# Patient Record
Sex: FEMALE | Race: WHITE | Employment: FULL TIME | URBAN - METROPOLITAN AREA
[De-identification: names, ages, dates, MRNs, and addresses within clinical notes are randomized per-mention and may not be internally consistent; named-entity substitution may affect disease eponyms.]

---

## 2024-03-19 ENCOUNTER — OFFICE VISIT (OUTPATIENT)
Dept: OBSTETRICS AND GYNECOLOGY | Facility: CLINIC | Age: 59
End: 2024-03-19
Payer: COMMERCIAL

## 2024-03-19 VITALS — WEIGHT: 157 LBS | DIASTOLIC BLOOD PRESSURE: 62 MMHG | BODY MASS INDEX: 24.23 KG/M2 | SYSTOLIC BLOOD PRESSURE: 100 MMHG

## 2024-03-19 DIAGNOSIS — K64.9 HEMORRHOIDS, UNSPECIFIED HEMORRHOID TYPE: Primary | ICD-10-CM

## 2024-03-19 DIAGNOSIS — R39.9 UTI SYMPTOMS: ICD-10-CM

## 2024-03-19 LAB
POC APPEARANCE, URINE: CLEAR
POC BILIRUBIN, URINE: NEGATIVE
POC BLOOD, URINE: ABNORMAL
POC COLOR, URINE: YELLOW
POC GLUCOSE, URINE: NEGATIVE MG/DL
POC KETONES, URINE: NEGATIVE MG/DL
POC LEUKOCYTES, URINE: ABNORMAL
POC NITRITE,URINE: POSITIVE
POC PH, URINE: 5 PH
POC PROTEIN, URINE: ABNORMAL MG/DL
POC SPECIFIC GRAVITY, URINE: >=1.03
POC UROBILINOGEN, URINE: 0.2 EU/DL

## 2024-03-19 PROCEDURE — 1036F TOBACCO NON-USER: CPT | Performed by: OBSTETRICS & GYNECOLOGY

## 2024-03-19 PROCEDURE — 87186 SC STD MICRODIL/AGAR DIL: CPT

## 2024-03-19 PROCEDURE — 81003 URINALYSIS AUTO W/O SCOPE: CPT | Performed by: OBSTETRICS & GYNECOLOGY

## 2024-03-19 PROCEDURE — 87086 URINE CULTURE/COLONY COUNT: CPT

## 2024-03-19 PROCEDURE — 99213 OFFICE O/P EST LOW 20 MIN: CPT | Performed by: OBSTETRICS & GYNECOLOGY

## 2024-03-19 RX ORDER — SULFAMETHOXAZOLE AND TRIMETHOPRIM 800; 160 MG/1; MG/1
1 TABLET ORAL 2 TIMES DAILY
Qty: 10 TABLET | Refills: 0 | Status: SHIPPED | OUTPATIENT
Start: 2024-03-19 | End: 2024-03-24

## 2024-03-19 NOTE — PROGRESS NOTES
Subjective   Patient ID: Kati Lauren is a 58 y.o. female who presents for POSSIBLE UTI. Yesterday started with dysuria, frequency/urgency.     Having issues with hemorrhoids.      Objective   Constitutional: Alert and in no acute distress. Well developed, well nourished.  Abdomen: soft nontender; no abdominal mass palpated, no organomegaly and no hernias.  Genitourinary: external genitalia: normal, no inguinal lymphadenopathy, Bartholin's urethral and [place]'s glands: normal, urethra: normal and bladder: normal on palpation.  Nontender exam.  Vagina: normal.  Cervix: normal.  Uterus: normal.   Right adnexa/parametria: normal.  Left adnexa/parametria: normal.  Large external hemorrhoid.  Psychiatric: alert and oriented x 3, affect normal to patient baseline and mood appropriate.     Assessment/Plan   -urine culture.  -Begin Bactrim until results back. Fluids, stop liners.  -refer to Dr. Byers for hemorrhoids.  -Follow up annual.

## 2024-03-20 ENCOUNTER — OFFICE VISIT (OUTPATIENT)
Dept: SURGERY | Facility: CLINIC | Age: 59
End: 2024-03-20
Payer: COMMERCIAL

## 2024-03-20 VITALS
DIASTOLIC BLOOD PRESSURE: 69 MMHG | HEIGHT: 67 IN | WEIGHT: 158.1 LBS | SYSTOLIC BLOOD PRESSURE: 111 MMHG | HEART RATE: 90 BPM | BODY MASS INDEX: 24.81 KG/M2

## 2024-03-20 DIAGNOSIS — K64.9 HEMORRHOIDS, UNSPECIFIED HEMORRHOID TYPE: Primary | ICD-10-CM

## 2024-03-20 PROCEDURE — 99203 OFFICE O/P NEW LOW 30 MIN: CPT | Performed by: PHYSICIAN ASSISTANT

## 2024-03-20 PROCEDURE — 1036F TOBACCO NON-USER: CPT | Performed by: PHYSICIAN ASSISTANT

## 2024-03-20 RX ORDER — HYDROCORTISONE ACETATE PRAMOXINE HCL 2.5; 1 G/100G; G/100G
CREAM TOPICAL 2 TIMES DAILY
Qty: 30 G | Refills: 2 | Status: SHIPPED | OUTPATIENT
Start: 2024-03-20

## 2024-03-20 NOTE — PROGRESS NOTES
"General Surgery Outpatient Consultation      History Of Present Illness  Kati Lauren is a 58 y.o. female who presents with complaints of a hemorrhoid that has been present for years. She first noticed hemorrhoidal issues after having 3 vaginal deliveries around 20 years ago. Patient has since noticed bulging around the anus and feels a bulge that she can push back in, but it will continue to pop back out after any strain. She has taken fibercon daily for years. She has tried sitz baths and steroid cream without much reduction in size. She feels pain after bowel movements that will fluctuate in severity based on what she eats. She does state she lives with chronic constipation. She also notices bleeding after bowel movements. Denies fevers, chills, intolerance of food, abdominal pain.        Past Medical History  Denies     Surgical History  Breast surgery      Social History   reports that she has never smoked. She has never used smokeless tobacco.  with 3 children. Works as a medical assistant. Denies alcohol or illicits.    Family History  Denies family history of diabetes, stroke, MI     Allergies  Patient has no known allergies.    Meds  Current Outpatient Medications   Medication Instructions    hydrocortisone-pramoxine (Analpram-HC) 2.5-1 % cream rectal, 2 times daily    psyllium seed, with sugar, (FIBER ORAL) oral    sulfamethoxazole-trimethoprim (Bactrim DS) 800-160 mg tablet 1 tablet, oral, 2 times daily        Review of Systems   A complete 10 point review of systems was performed and is negative except as noted in the history of present illness.     Last Recorded Vitals  Blood pressure 111/69, pulse 90, height 1.702 m (5' 7\"), weight 71.7 kg (158 lb 1.6 oz).    Physical Exam  Constitutional: No acute distress. Sitting up on exam table.  Neuro: Alert, oriented. Follows commands.   Eyes: Extraocular motions intact. No scleral icterus. Conjunctiva pink.   EENT: Mucous membranes moist. Normal " dentition. Hears normal speaking voice.  Neck: Supple. No masses.  Cardiovascular: Regular rate. No pitting edema.   Respiratory: No increased work of breathing or audible wheeze. Equal expansion.   Abdomen: Soft, non obese abdomen. Nondistended.   Rectal: Prolapsed internal hemorrhoid with pink, friable tissue. Posterior rim of anus with deflated external tissue.   MSK: Moves all extremities. No atrophy.  Lymphatic: No palpable lymph nodes. No lymphedema.   Skin: Warm, dry, intact. No rashes or lesions.   Psychological: Appropriate mood and behavior.           Relevant Results  Laboratory Results:  none    Imaging:  none       Assessment/Plan   This is a 58 y.o. female who presents with complaints of hemorrhoidal issues since having vaginal deliveries 20 years ago. On exam, she does have some component of chronic external hemorrhoids, but her main concern is a prolapsing internal hemorrhoid that is reducible. She would like to avoid a surgical hemorrhoidectomy at this time. I therefore recommend HET to help with the internal hemorrhoids. Discussed potential for needing more than one HET procedure, bleeding, pain, or need for formal surgical hemorrhoidectomy in future.    Plan:  -- HET in OR under MAC  -- Prescription for Analpram to help with symptoms  -- Continue daily fibercon, increase water intake, use sitz baths as needed  -- Follow up in 3 weeks after procedure                 Discussed with Dr. Byers who is in agreement with plan. Please doc halo with questions.    Sue Hdz PA-C

## 2024-03-21 LAB — BACTERIA UR CULT: ABNORMAL

## 2024-04-07 NOTE — PROGRESS NOTES
Subjective   Patient ID: Kati Lauren is a 58 y.o. female who presents for No chief complaint on file..    PAP 11-9-18 NEG HPV NEG  MAMMO 8/6/22 has order  DEXA NEVER  COLON YRS AGO HX POLYP in 30s, went every 3 years. Then nothing. Will check when she is due.     Patient recently seen in March 2024 for UTI. Referred to Dr. Byers for hemorrhoids.    - HAVING INCONTINENCE WHEN EXERCISING, where pad with tennis. Has not felt the same since her daughter, no caffeine. Did not see Dr. Lucas. Using tampons with exercising and it doesn't bother her.     Has hemorrhoids. Doesn't bleed but it bothers her. Does bleed sometimes. No discomfort.      34, 19, 16. Home schooling daughter.     No VB.     Takes vit B12, Takes vit D.       Review of Systems    Objective       Assessment/Plan

## 2024-04-08 ENCOUNTER — OFFICE VISIT (OUTPATIENT)
Dept: OBSTETRICS AND GYNECOLOGY | Facility: CLINIC | Age: 59
End: 2024-04-08
Payer: COMMERCIAL

## 2024-04-08 VITALS
BODY MASS INDEX: 24.39 KG/M2 | DIASTOLIC BLOOD PRESSURE: 60 MMHG | WEIGHT: 155.4 LBS | HEIGHT: 67 IN | SYSTOLIC BLOOD PRESSURE: 110 MMHG

## 2024-04-19 NOTE — PROGRESS NOTES
Subjective   Patient ID: Kati Lauren is a 58 y.o. female who presents for No chief complaint on file..    PAP 11-9-18 NEG HPV NEG  MAMMO 8/6/22 has order  DEXA NEVER  COLON YRS AGO HX POLYP in 30s, went every 3 years. Then nothing. Will check when she is due.     - HAVING INCONTINENCE WHEN EXERCISING, where pad with tennis. Has not felt the same since her daughter, no caffeine. Did not see Dr. Lucas. Using tampons with exercising and it doesn't bother her.     Has hemorrhoids. Doesn't bleed but it bothers her. Does bleed sometimes. No discomfort.      34, 19, 16. Home schooling daughter.     No VB.     Takes vit B12, Takes vit D.       Review of Systems    Objective       Assessment/Plan

## 2024-04-22 ENCOUNTER — APPOINTMENT (OUTPATIENT)
Dept: OBSTETRICS AND GYNECOLOGY | Facility: CLINIC | Age: 59
End: 2024-04-22
Payer: COMMERCIAL

## 2024-05-22 ENCOUNTER — APPOINTMENT (OUTPATIENT)
Dept: SURGERY | Facility: CLINIC | Age: 59
End: 2024-05-22
Payer: COMMERCIAL

## 2024-05-29 ENCOUNTER — ANESTHESIA EVENT (OUTPATIENT)
Dept: OPERATING ROOM | Facility: HOSPITAL | Age: 59
End: 2024-05-29
Payer: COMMERCIAL

## 2024-05-29 ENCOUNTER — TELEPHONE (OUTPATIENT)
Dept: OBSTETRICS AND GYNECOLOGY | Facility: CLINIC | Age: 59
End: 2024-05-29
Payer: COMMERCIAL

## 2024-05-29 DIAGNOSIS — R39.9 UTI SYMPTOMS: Primary | ICD-10-CM

## 2024-05-29 DIAGNOSIS — R39.9 UTI SYMPTOMS: ICD-10-CM

## 2024-05-29 RX ORDER — NITROFURANTOIN 25; 75 MG/1; MG/1
100 CAPSULE ORAL 2 TIMES DAILY
Qty: 14 CAPSULE | Refills: 0 | Status: SHIPPED | OUTPATIENT
Start: 2024-05-29 | End: 2024-06-05

## 2024-05-29 RX ORDER — NITROFURANTOIN 25; 75 MG/1; MG/1
100 CAPSULE ORAL 2 TIMES DAILY
Qty: 14 CAPSULE | Refills: 0 | Status: SHIPPED | OUTPATIENT
Start: 2024-05-29 | End: 2024-05-29

## 2024-06-04 ENCOUNTER — ANESTHESIA (OUTPATIENT)
Dept: OPERATING ROOM | Facility: HOSPITAL | Age: 59
End: 2024-06-04
Payer: COMMERCIAL

## 2024-06-04 ENCOUNTER — HOSPITAL ENCOUNTER (OUTPATIENT)
Facility: HOSPITAL | Age: 59
Setting detail: OUTPATIENT SURGERY
Discharge: HOME | End: 2024-06-04
Attending: SURGERY | Admitting: SURGERY
Payer: COMMERCIAL

## 2024-06-04 VITALS
RESPIRATION RATE: 15 BRPM | BODY MASS INDEX: 23.42 KG/M2 | HEART RATE: 69 BPM | WEIGHT: 154.54 LBS | DIASTOLIC BLOOD PRESSURE: 6 MMHG | TEMPERATURE: 97.5 F | SYSTOLIC BLOOD PRESSURE: 110 MMHG | OXYGEN SATURATION: 98 % | HEIGHT: 68 IN

## 2024-06-04 DIAGNOSIS — K64.9 HEMORRHOIDS, UNSPECIFIED HEMORRHOID TYPE: Primary | ICD-10-CM

## 2024-06-04 PROCEDURE — 7100000010 HC PHASE TWO TIME - EACH INCREMENTAL 1 MINUTE: Performed by: SURGERY

## 2024-06-04 PROCEDURE — 2720000007 HC OR 272 NO HCPCS: Performed by: SURGERY

## 2024-06-04 PROCEDURE — 3700000001 HC GENERAL ANESTHESIA TIME - INITIAL BASE CHARGE: Performed by: SURGERY

## 2024-06-04 PROCEDURE — 3700000002 HC GENERAL ANESTHESIA TIME - EACH INCREMENTAL 1 MINUTE: Performed by: SURGERY

## 2024-06-04 PROCEDURE — 3600000007 HC OR TIME - EACH INCREMENTAL 1 MINUTE - PROCEDURE LEVEL TWO: Performed by: SURGERY

## 2024-06-04 PROCEDURE — 3600000002 HC OR TIME - INITIAL BASE CHARGE - PROCEDURE LEVEL TWO: Performed by: SURGERY

## 2024-06-04 PROCEDURE — 2500000004 HC RX 250 GENERAL PHARMACY W/ HCPCS (ALT 636 FOR OP/ED): Performed by: NURSE ANESTHETIST, CERTIFIED REGISTERED

## 2024-06-04 PROCEDURE — 2500000005 HC RX 250 GENERAL PHARMACY W/O HCPCS: Performed by: SURGERY

## 2024-06-04 PROCEDURE — 7100000009 HC PHASE TWO TIME - INITIAL BASE CHARGE: Performed by: SURGERY

## 2024-06-04 PROCEDURE — 46930 DESTROY INTERNAL HEMORRHOIDS: CPT | Performed by: SURGERY

## 2024-06-04 PROCEDURE — 2500000005 HC RX 250 GENERAL PHARMACY W/O HCPCS: Performed by: NURSE ANESTHETIST, CERTIFIED REGISTERED

## 2024-06-04 PROCEDURE — 2500000004 HC RX 250 GENERAL PHARMACY W/ HCPCS (ALT 636 FOR OP/ED): Performed by: ANESTHESIOLOGY

## 2024-06-04 RX ORDER — MIDAZOLAM HYDROCHLORIDE 1 MG/ML
INJECTION INTRAMUSCULAR; INTRAVENOUS AS NEEDED
Status: DISCONTINUED | OUTPATIENT
Start: 2024-06-04 | End: 2024-06-04

## 2024-06-04 RX ORDER — ONDANSETRON HYDROCHLORIDE 2 MG/ML
4 INJECTION, SOLUTION INTRAVENOUS ONCE AS NEEDED
Status: DISCONTINUED | OUTPATIENT
Start: 2024-06-04 | End: 2024-06-04 | Stop reason: HOSPADM

## 2024-06-04 RX ORDER — LIDOCAINE HYDROCHLORIDE 10 MG/ML
INJECTION, SOLUTION EPIDURAL; INFILTRATION; INTRACAUDAL; PERINEURAL AS NEEDED
Status: DISCONTINUED | OUTPATIENT
Start: 2024-06-04 | End: 2024-06-04

## 2024-06-04 RX ORDER — IBUPROFEN 600 MG/1
600 TABLET ORAL ONCE
Status: DISCONTINUED | OUTPATIENT
Start: 2024-06-04 | End: 2024-06-04 | Stop reason: HOSPADM

## 2024-06-04 RX ORDER — DROPERIDOL 2.5 MG/ML
0.62 INJECTION, SOLUTION INTRAMUSCULAR; INTRAVENOUS ONCE AS NEEDED
Status: DISCONTINUED | OUTPATIENT
Start: 2024-06-04 | End: 2024-06-04 | Stop reason: HOSPADM

## 2024-06-04 RX ORDER — LIDOCAINE HYDROCHLORIDE 20 MG/ML
JELLY TOPICAL AS NEEDED
Status: DISCONTINUED | OUTPATIENT
Start: 2024-06-04 | End: 2024-06-04 | Stop reason: HOSPADM

## 2024-06-04 RX ORDER — OXYCODONE HYDROCHLORIDE 5 MG/1
5 TABLET ORAL EVERY 4 HOURS PRN
Status: DISCONTINUED | OUTPATIENT
Start: 2024-06-04 | End: 2024-06-04 | Stop reason: HOSPADM

## 2024-06-04 RX ORDER — SODIUM CHLORIDE, SODIUM LACTATE, POTASSIUM CHLORIDE, CALCIUM CHLORIDE 600; 310; 30; 20 MG/100ML; MG/100ML; MG/100ML; MG/100ML
100 INJECTION, SOLUTION INTRAVENOUS CONTINUOUS
Status: DISCONTINUED | OUTPATIENT
Start: 2024-06-04 | End: 2024-06-04 | Stop reason: HOSPADM

## 2024-06-04 RX ORDER — PROPOFOL 10 MG/ML
INJECTION, EMULSION INTRAVENOUS AS NEEDED
Status: DISCONTINUED | OUTPATIENT
Start: 2024-06-04 | End: 2024-06-04

## 2024-06-04 RX ORDER — FENTANYL CITRATE 50 UG/ML
INJECTION, SOLUTION INTRAMUSCULAR; INTRAVENOUS AS NEEDED
Status: DISCONTINUED | OUTPATIENT
Start: 2024-06-04 | End: 2024-06-04

## 2024-06-04 RX ADMIN — SODIUM CHLORIDE, POTASSIUM CHLORIDE, SODIUM LACTATE AND CALCIUM CHLORIDE 100 ML/HR: 600; 310; 30; 20 INJECTION, SOLUTION INTRAVENOUS at 07:42

## 2024-06-04 RX ADMIN — MIDAZOLAM HYDROCHLORIDE 1 MG: 1 INJECTION, SOLUTION INTRAMUSCULAR; INTRAVENOUS at 08:07

## 2024-06-04 RX ADMIN — LIDOCAINE HYDROCHLORIDE 50 MG: 10 INJECTION, SOLUTION EPIDURAL; INFILTRATION; INTRACAUDAL; PERINEURAL at 08:13

## 2024-06-04 RX ADMIN — PROPOFOL 50 MG: 10 INJECTION, EMULSION INTRAVENOUS at 08:14

## 2024-06-04 RX ADMIN — PROPOFOL 50 MG: 10 INJECTION, EMULSION INTRAVENOUS at 08:13

## 2024-06-04 RX ADMIN — FENTANYL CITRATE 50 MCG: 50 INJECTION, SOLUTION INTRAMUSCULAR; INTRAVENOUS at 08:07

## 2024-06-04 RX ADMIN — PROPOFOL 140 MCG/KG/MIN: 10 INJECTION, EMULSION INTRAVENOUS at 08:15

## 2024-06-04 SDOH — HEALTH STABILITY: MENTAL HEALTH: CURRENT SMOKER: 0

## 2024-06-04 ASSESSMENT — PAIN - FUNCTIONAL ASSESSMENT: PAIN_FUNCTIONAL_ASSESSMENT: 0-10

## 2024-06-04 ASSESSMENT — PAIN SCALES - GENERAL
PAINLEVEL_OUTOF10: 0 - NO PAIN
PAIN_LEVEL: 2

## 2024-06-04 NOTE — H&P
"History Of Present Illness  Kati Lauren is a 58 y.o. female presenting with hemorrhoids.     Past Medical History  Past Medical History:   Diagnosis Date    Encounter for screening for malignant neoplasm of colon 01/27/2020    Encounter for screening colonoscopy    Hemorrhoids     Rectal bleeding     Urinary incontinence        Surgical History  Past Surgical History:   Procedure Laterality Date    OTHER SURGICAL HISTORY  08/26/2022    Breast surgery        Social History  She reports that she has never smoked. She has never used smokeless tobacco. She reports that she does not currently use alcohol. She reports that she does not use drugs.    Family History  No family history on file.     Allergies  Patient has no known allergies.    Review of Systems     Physical Exam     Last Recorded Vitals  Blood pressure 111/73, pulse 67, temperature 36.3 °C (97.3 °F), resp. rate 16, height 1.727 m (5' 8\"), weight 70.1 kg (154 lb 8.7 oz), SpO2 96%.    Relevant Results        hemorrhoids     Assessment/Plan   Principal Problem:    Hemorrhoids      hemorrhoids       I spent 5 minutes in the professional and overall care of this patient.      Philippe Byers MD    "

## 2024-06-04 NOTE — OP NOTE
HEMORRHOID ENERGY THERAPY     Operative Date: 06/04/24  Patient's Name: Kati Lauren  Patient's YOB: 1965  Patient's MRN: [id number]  Patient's Age: 58 y.o.  Operating Room Location: [facility]  Patient's ASA: I  Patient's Estimated Blood Loss: 1 mL      PREOPERATIVE DIAGNOSIS:   Internal and external hemorrhoids        POSTOPERATIVE DIAGNOSIS:   Internal and external hemorrhoids        OPERATION/PROCEDURE:   Hemorrhoid energy therapy of all 3 internal hemorrhoid columns        SURGEON:   Philippe Byers MD.         ASSISTANT:   Scrub assist.           INDICATIONS:   This is a 58 y.o. female who presented with a symptomatic prolapsing internal hemorrhoids.  She also had external hemorrhoids.  We discussed medical management as well as hemorrhoid energy therapy (HET) for the internal hemorrhoids.  We discussed that if this failed, we could resort to a surgical hemorrhoidectomy.        OPERATION/PROCEDURE:   The reasons for, benefits to, and risks of surgery were discussed with the patient.  The risks include, but are not limited to, bleeding, infection, anal stenosis, incontinence, and recurrence.  The patient appeared to understand and consented for surgery.  She was therefore brought back to the operating room and placed on the operating room table in the left lateral decubitus position.      We started with our lighted HET anoscope and placed it into the anal canal.  We started with the left lateral internal hemorrhoid column.  We elevated the hemorrhoid column between her bipolar plates of the HET anoscope, and cauterized to a temperature of 50 °C.  We then continued circumferentially around the anal canal.  We ensured that we treated all 3 hemorrhoid columns in the same fashion.  We also treated the overlapping tissue between the columns.  We did a total of 2 rounds of treatment around the internal hemorrhoid column.  We then used our lighted HET anoscope to  inspect our treatment regions, and confirmed good bipolar cautery lines.  We then placed rolled up surgical foam soaked in lidocaine jelly into the anal canal for bleeding and pain control.        DISPOSITION:   The patient tolerated the procedure well.  There were no immediate complications.  She will be brought to the postanesthesia care unit. From there, she will be discharged home with outpatient followup with me in a month.      I was present and scrubbed in for the entire procedure.      CPT Codes:  07214      Operating Room Staff:  Anesthesiologist: Sathya Askew MD  CRNA: ADI Babb-CRNA  Circulator: Misti Baeza RN  Scrub Person: Francis Byers MD  General Surgery  Office: [phone]  Fax:     [phone]  8:31 AM  06/04/24

## 2024-06-04 NOTE — ANESTHESIA POSTPROCEDURE EVALUATION
Patient: Kati Lauren    Procedure Summary       Date: 06/04/24 Room / Location: [facility] OR 08 / [facility]    Anesthesia Start: 0754 Anesthesia Stop: 0825    Procedure: HEMORRHOID ENERGY THERAPY Diagnosis:       Hemorrhoids, unspecified hemorrhoid type      (Hemorrhoids, unspecified hemorrhoid type [K64.9])    Surgeons: Philippe Byers MD Responsible Provider: Sathya Askew MD    Anesthesia Type: MAC ASA Status: 1            Anesthesia Type: MAC    Vitals Value Taken Time   BP 98/55 06/04/24 0822   Temp 36.4 °C (97.5 °F) 06/04/24 0822   Pulse 62 06/04/24 0822   Resp 15 06/04/24 0822   SpO2 98 % 06/04/24 0822       Anesthesia Post Evaluation    Patient location during evaluation: PACU  Patient participation: complete - patient participated  Level of consciousness: awake  Pain score: 2  Pain management: adequate  Multimodal analgesia pain management approach  Airway patency: patent  Two or more strategies used to mitigate risk of obstructive sleep apnea  Cardiovascular status: acceptable  Respiratory status: acceptable  Hydration status: acceptable  Postoperative Nausea and Vomiting: none    No notable events documented.

## 2024-06-04 NOTE — ANESTHESIA PREPROCEDURE EVALUATION
Patient: Kati Lauren    Procedure Information       Date/Time: 06/04/24 0800    Procedure: HEMORRHOID ENERGY THERAPY - HEMORRHOID ENERGY THERAPY    Location: [facility] OR 08 / [facility]    Surgeons: Philippe Byers MD            Relevant Problems   Anesthesia (within normal limits)      Cardiac (within normal limits)      Pulmonary (within normal limits)      Neuro (within normal limits)      GI (within normal limits)      /Renal (within normal limits)      Liver (within normal limits)      Endocrine (within normal limits)      Hematology (within normal limits)      Musculoskeletal (within normal limits)      HEENT (within normal limits)      ID (within normal limits)      Skin (within normal limits)       Clinical information reviewed:   Tobacco  Allergies  Meds   Med Hx  Surg Hx  OB Status  Fam Hx  Soc   Hx        NPO Detail:  NPO/Void Status  Carbohydrate Drink Given Prior to Surgery? : N  Date of Last Liquid: 06/03/24  Time of Last Liquid: 2300  Date of Last Solid: 06/03/24  Time of Last Solid: 2300  Last Intake Type: Clear fluids  Time of Last Void: 0700         Physical Exam    Airway  Mallampati: II  TM distance: >3 FB  Neck ROM: full     Cardiovascular - normal exam     Dental - normal exam     Pulmonary - normal exam     Abdominal - normal exam             Anesthesia Plan    History of general anesthesia?: no  History of complications of general anesthesia?: no    ASA 1     MAC     The patient is not a current smoker.    intravenous induction   Anesthetic plan and risks discussed with patient.  Use of blood products discussed with who consented to blood products.    Plan discussed with CRNA and attending.

## 2024-09-11 ENCOUNTER — APPOINTMENT (OUTPATIENT)
Dept: SURGERY | Facility: CLINIC | Age: 59
End: 2024-09-11
Payer: COMMERCIAL

## 2024-09-11 DIAGNOSIS — K64.9 HEMORRHOIDS, UNSPECIFIED HEMORRHOID TYPE: Primary | ICD-10-CM

## 2024-09-11 PROCEDURE — 99213 OFFICE O/P EST LOW 20 MIN: CPT | Performed by: SURGERY

## 2024-09-11 NOTE — PROGRESS NOTES
Subjective   Patient ID: Kati Lauren is a 58 y.o. female who presents for Annual Exam.    PAP 18-26-22 NEG NEG; 1-9-18 NEG HPV NEG  MAMMO 8/6/22 has order  DEXA NEVER  COLON YRS AGO HX POLYP in 30s, went every 3 years. Then nothing.       Referred to Dr. Byers for hemorrhoids. Had light therapy. Doing better. Will get another treatment. Getting a colonoscopy.     Incontinence better, not an issues. Did not see Dr. Lucas. Using tampons with exercising and it doesn't bother her.     Kids all grown.     No VB. Has been getting UTIs, not sexually active. Wiping front to back. Last one 2 weeks ago. Uses a bide.     Takes vit B12, Takes vit D.       Review of Systems   All other systems reviewed and are negative.      Objective   Constitutional: Alert and in no acute distress. Well developed, well nourished.  Neck: no neck asymmetry. Supple and thyroid not enlarged and there were no palpable thyroid nodules.  Chest: Breasts normal appearance, no nipple discharge and no skin changes and palpation of breasts and axillae: no palpable mass and no axillary lymphadenopathy.  Abdomen: soft nontender; no abdominal mass palpated, no organomegaly and no hernias.  Genitourinary: external genitalia: normal, no inguinal lymphadenopathy, Bartholin's urethral and [place]'s glands: normal, urethra: normal and bladder: normal on palpation.  Vagina: normal.  Cervix: normal.  Uterus: normal.   Right adnexa/parametria: normal.  Left adnexa/parametria: normal.  Hemorrhoids improved.  Skin: normal skin color and pigmentation, normal skin turgor and no rash.  Psychiatric: alert and oriented x 3, affect normal to patient baseline and mood appropriate.     Assessment/Plan   -no pap  -italo-bossman  -Requent UTIs. Patient will start by stopping Bide. UTIs started after using that.  -Lipids, CMP, CBC, vit D, TSH. Has Dr. Alicea.

## 2024-09-11 NOTE — H&P (VIEW-ONLY)
"General Surgery Follow-Up Note    Referring Provider:   No primary care provider on file.  No ref. provider found      Chief Complaint:  Persistent hemorrhoids     History of Present Illness:  This is a 58 y.o. female who presents for persistent hemorrhoids.  She was last seen 3 month(s) ago.  She reports drastic improvement in her hemorrhoids, but they are still present.  She reports no further bleeding, but still having prolapsing. She reports it has been more than 10 years since her last colonoscopy.      Vitals:   There were no vitals filed for this visit.      Physical Exam:  General: No acute distress. Sitting up in bed.   Neuro: Alert and oriented ×3. Follows commands.  Head: Atraumatic  Eyes: Pupils equal reactive to light. Extraocular motions intact.  Ears: Hears normal speaking voice.  Mouth, Nose, Throat: Mucous membranes moist.  Normal dentition.  Chest: No crepitus.  No appreciable scars.  Heart: Regular rate and rhythm.  Lung: Clear to auscultation bilaterally.  Vascular: No carotid bruits.  Palpable radial pulses bilaterally.  Abdomen: Soft. Nondistended. Nontender.    Musculoskeletal: Moves all extremities.  Normal range of motion.  Lymphatic: No palpable lymph nodes.  Skin: No rashes or lesions.  Psychological: Normal affect    Labs:  CBC: No results found for: \"WBC\", \"RBC\", \"HGB\", \"HCT\", \"PLT\"    RFP: No results found for: \"GLUF\", \"NA\", \"K\", \"CL\", \"CO2\", \"BUN\", \"CREATININE\", \"CALCIUM\", \"MG\", \"PHOS\"     LFTs: No results found for: \"PROT\", \"ALBUMIN\", \"BILITOT\", \"BILIDIR\", \"ALKPHOS\", \"AST\", \"ALT\"         Imaging: I have personally reviewed the images and the radiologist's report.  No results found.      Assessment:  This is a 58 y.o.-year-old female who presents for hemorrhoids.  We discussed that I recommend a screening colonoscopy with concurrent repeat HET.      Plan:  -- HET  -- Colonoscopy.    Luc Byers MD  General Surgery  Office: [phone]  Fax: [phone]  12:16 " PM  09/11/24        Past Medical History:  Past Medical History:   Diagnosis Date    Encounter for screening for malignant neoplasm of colon 01/27/2020    Encounter for screening colonoscopy    Hemorrhoids     Rectal bleeding     Urinary incontinence         Past Surgical History:  Past Surgical History:   Procedure Laterality Date    OTHER SURGICAL HISTORY  08/26/2022    Breast surgery        Medications:  Current Outpatient Medications   Medication Instructions    hydrocortisone-pramoxine (Analpram-HC) 2.5-1 % cream rectal, 2 times daily    psyllium seed, with sugar, (FIBER ORAL) oral        Allergies:  No Known Allergies     Family History:  No family history on file.     Social History:  Social History     Socioeconomic History    Marital status:    Tobacco Use    Smoking status: Never    Smokeless tobacco: Never   Vaping Use    Vaping status: Never Used   Substance and Sexual Activity    Alcohol use: Not Currently    Drug use: Never    Sexual activity: Yes        Review of Systems:  A complete 12 point review of systems was performed and is negative except as noted in the history of present illness.

## 2024-09-11 NOTE — PROGRESS NOTES
"General Surgery Follow-Up Note    Referring Provider:   No primary care provider on file.  No ref. provider found      Chief Complaint:  Persistent hemorrhoids     History of Present Illness:  This is a 58 y.o. female who presents for persistent hemorrhoids.  She was last seen 3 month(s) ago.  She reports drastic improvement in her hemorrhoids, but they are still present.  She reports no further bleeding, but still having prolapsing. She reports it has been more than 10 years since her last colonoscopy.      Vitals:   There were no vitals filed for this visit.      Physical Exam:  General: No acute distress. Sitting up in bed.   Neuro: Alert and oriented ×3. Follows commands.  Head: Atraumatic  Eyes: Pupils equal reactive to light. Extraocular motions intact.  Ears: Hears normal speaking voice.  Mouth, Nose, Throat: Mucous membranes moist.  Normal dentition.  Chest: No crepitus.  No appreciable scars.  Heart: Regular rate and rhythm.  Lung: Clear to auscultation bilaterally.  Vascular: No carotid bruits.  Palpable radial pulses bilaterally.  Abdomen: Soft. Nondistended. Nontender.    Musculoskeletal: Moves all extremities.  Normal range of motion.  Lymphatic: No palpable lymph nodes.  Skin: No rashes or lesions.  Psychological: Normal affect    Labs:  CBC: No results found for: \"WBC\", \"RBC\", \"HGB\", \"HCT\", \"PLT\"    RFP: No results found for: \"GLUF\", \"NA\", \"K\", \"CL\", \"CO2\", \"BUN\", \"CREATININE\", \"CALCIUM\", \"MG\", \"PHOS\"     LFTs: No results found for: \"PROT\", \"ALBUMIN\", \"BILITOT\", \"BILIDIR\", \"ALKPHOS\", \"AST\", \"ALT\"         Imaging: I have personally reviewed the images and the radiologist's report.  No results found.      Assessment:  This is a 58 y.o.-year-old female who presents for hemorrhoids.  We discussed that I recommend a screening colonoscopy with concurrent repeat HET.      Plan:  -- HET  -- Colonoscopy.    Luc Byers MD  General Surgery  Office: [phone]  Fax: [phone]  12:16 " PM  09/11/24        Past Medical History:  Past Medical History:   Diagnosis Date    Encounter for screening for malignant neoplasm of colon 01/27/2020    Encounter for screening colonoscopy    Hemorrhoids     Rectal bleeding     Urinary incontinence         Past Surgical History:  Past Surgical History:   Procedure Laterality Date    OTHER SURGICAL HISTORY  08/26/2022    Breast surgery        Medications:  Current Outpatient Medications   Medication Instructions    hydrocortisone-pramoxine (Analpram-HC) 2.5-1 % cream rectal, 2 times daily    psyllium seed, with sugar, (FIBER ORAL) oral        Allergies:  No Known Allergies     Family History:  No family history on file.     Social History:  Social History     Socioeconomic History    Marital status:    Tobacco Use    Smoking status: Never    Smokeless tobacco: Never   Vaping Use    Vaping status: Never Used   Substance and Sexual Activity    Alcohol use: Not Currently    Drug use: Never    Sexual activity: Yes        Review of Systems:  A complete 12 point review of systems was performed and is negative except as noted in the history of present illness.

## 2024-09-12 ENCOUNTER — APPOINTMENT (OUTPATIENT)
Dept: OBSTETRICS AND GYNECOLOGY | Facility: CLINIC | Age: 59
End: 2024-09-12
Payer: COMMERCIAL

## 2024-09-12 ENCOUNTER — LAB (OUTPATIENT)
Dept: LAB | Facility: LAB | Age: 59
End: 2024-09-12
Payer: COMMERCIAL

## 2024-09-12 VITALS
BODY MASS INDEX: 24.17 KG/M2 | SYSTOLIC BLOOD PRESSURE: 110 MMHG | DIASTOLIC BLOOD PRESSURE: 70 MMHG | HEIGHT: 67 IN | WEIGHT: 154 LBS

## 2024-09-12 DIAGNOSIS — Z13.21 ENCOUNTER FOR VITAMIN DEFICIENCY SCREENING: ICD-10-CM

## 2024-09-12 DIAGNOSIS — K64.9 HEMORRHOIDS, UNSPECIFIED HEMORRHOID TYPE: ICD-10-CM

## 2024-09-12 DIAGNOSIS — Z13.220 SCREENING CHOLESTEROL LEVEL: ICD-10-CM

## 2024-09-12 DIAGNOSIS — Z00.00 BLOOD TESTS FOR ROUTINE GENERAL PHYSICAL EXAMINATION: ICD-10-CM

## 2024-09-12 DIAGNOSIS — Z13.29 THYROID DISORDER SCREEN: ICD-10-CM

## 2024-09-12 DIAGNOSIS — Z12.11 COLON CANCER SCREENING: Primary | ICD-10-CM

## 2024-09-12 DIAGNOSIS — Z01.419 WELL WOMAN EXAM WITH ROUTINE GYNECOLOGICAL EXAM: Primary | ICD-10-CM

## 2024-09-12 DIAGNOSIS — Z12.31 ENCOUNTER FOR SCREENING MAMMOGRAM FOR MALIGNANT NEOPLASM OF BREAST: ICD-10-CM

## 2024-09-12 LAB
25(OH)D3 SERPL-MCNC: 63 NG/ML (ref 30–100)
ALBUMIN SERPL BCP-MCNC: 4.7 G/DL (ref 3.4–5)
ALP SERPL-CCNC: 72 U/L (ref 33–110)
ALT SERPL W P-5'-P-CCNC: 28 U/L (ref 7–45)
ANION GAP SERPL CALC-SCNC: 14 MMOL/L (ref 10–20)
AST SERPL W P-5'-P-CCNC: 19 U/L (ref 9–39)
BILIRUB SERPL-MCNC: 0.5 MG/DL (ref 0–1.2)
BUN SERPL-MCNC: 14 MG/DL (ref 6–23)
CALCIUM SERPL-MCNC: 10 MG/DL (ref 8.6–10.6)
CHLORIDE SERPL-SCNC: 104 MMOL/L (ref 98–107)
CHOLEST SERPL-MCNC: 262 MG/DL (ref 0–199)
CHOLESTEROL/HDL RATIO: 3.8
CO2 SERPL-SCNC: 28 MMOL/L (ref 21–32)
CREAT SERPL-MCNC: 0.78 MG/DL (ref 0.5–1.05)
EGFRCR SERPLBLD CKD-EPI 2021: 89 ML/MIN/1.73M*2
ERYTHROCYTE [DISTWIDTH] IN BLOOD BY AUTOMATED COUNT: 12.7 % (ref 11.5–14.5)
GLUCOSE SERPL-MCNC: 90 MG/DL (ref 74–99)
HCT VFR BLD AUTO: 43.9 % (ref 36–46)
HDLC SERPL-MCNC: 69 MG/DL
HGB BLD-MCNC: 14.5 G/DL (ref 12–16)
LDLC SERPL CALC-MCNC: 175 MG/DL
MCH RBC QN AUTO: 29.1 PG (ref 26–34)
MCHC RBC AUTO-ENTMCNC: 33 G/DL (ref 32–36)
MCV RBC AUTO: 88 FL (ref 80–100)
NON HDL CHOLESTEROL: 193 MG/DL (ref 0–149)
NRBC BLD-RTO: 0 /100 WBCS (ref 0–0)
PLATELET # BLD AUTO: 357 X10*3/UL (ref 150–450)
POC BLOOD, URINE: NEGATIVE
POC GLUCOSE, URINE: NEGATIVE MG/DL
POC KETONES, URINE: NEGATIVE MG/DL
POC LEUKOCYTES, URINE: NEGATIVE
POC NITRITE,URINE: NEGATIVE
POC PROTEIN, URINE: NEGATIVE MG/DL
POTASSIUM SERPL-SCNC: 4 MMOL/L (ref 3.5–5.3)
PROT SERPL-MCNC: 7.4 G/DL (ref 6.4–8.2)
RBC # BLD AUTO: 4.98 X10*6/UL (ref 4–5.2)
SODIUM SERPL-SCNC: 142 MMOL/L (ref 136–145)
TRIGL SERPL-MCNC: 88 MG/DL (ref 0–149)
TSH SERPL-ACNC: 0.77 MIU/L (ref 0.44–3.98)
VLDL: 18 MG/DL (ref 0–40)
WBC # BLD AUTO: 5 X10*3/UL (ref 4.4–11.3)

## 2024-09-12 PROCEDURE — 3008F BODY MASS INDEX DOCD: CPT | Performed by: OBSTETRICS & GYNECOLOGY

## 2024-09-12 PROCEDURE — 80061 LIPID PANEL: CPT

## 2024-09-12 PROCEDURE — 82306 VITAMIN D 25 HYDROXY: CPT

## 2024-09-12 PROCEDURE — 36415 COLL VENOUS BLD VENIPUNCTURE: CPT

## 2024-09-12 PROCEDURE — 81003 URINALYSIS AUTO W/O SCOPE: CPT | Performed by: OBSTETRICS & GYNECOLOGY

## 2024-09-12 PROCEDURE — 1036F TOBACCO NON-USER: CPT | Performed by: OBSTETRICS & GYNECOLOGY

## 2024-09-12 PROCEDURE — 85027 COMPLETE CBC AUTOMATED: CPT

## 2024-09-12 PROCEDURE — 80053 COMPREHEN METABOLIC PANEL: CPT

## 2024-09-12 PROCEDURE — 99396 PREV VISIT EST AGE 40-64: CPT | Performed by: OBSTETRICS & GYNECOLOGY

## 2024-09-12 PROCEDURE — 84443 ASSAY THYROID STIM HORMONE: CPT

## 2024-09-27 ENCOUNTER — ANESTHESIA EVENT (OUTPATIENT)
Dept: OPERATING ROOM | Facility: HOSPITAL | Age: 59
End: 2024-09-27
Payer: COMMERCIAL

## 2024-09-27 RX ORDER — ONDANSETRON HYDROCHLORIDE 2 MG/ML
4 INJECTION, SOLUTION INTRAVENOUS ONCE AS NEEDED
Status: CANCELLED | OUTPATIENT
Start: 2024-09-27

## 2024-09-27 RX ORDER — SODIUM CHLORIDE, SODIUM LACTATE, POTASSIUM CHLORIDE, CALCIUM CHLORIDE 600; 310; 30; 20 MG/100ML; MG/100ML; MG/100ML; MG/100ML
100 INJECTION, SOLUTION INTRAVENOUS CONTINUOUS
Status: CANCELLED | OUTPATIENT
Start: 2024-09-27

## 2024-09-27 RX ORDER — DROPERIDOL 2.5 MG/ML
0.62 INJECTION, SOLUTION INTRAMUSCULAR; INTRAVENOUS ONCE AS NEEDED
Status: CANCELLED | OUTPATIENT
Start: 2024-09-27

## 2024-09-30 ENCOUNTER — HOSPITAL ENCOUNTER (OUTPATIENT)
Dept: OPERATING ROOM | Facility: HOSPITAL | Age: 59
Setting detail: OUTPATIENT SURGERY
Discharge: HOME | End: 2024-09-30
Payer: COMMERCIAL

## 2024-09-30 ENCOUNTER — ANESTHESIA (OUTPATIENT)
Dept: OPERATING ROOM | Facility: HOSPITAL | Age: 59
End: 2024-09-30
Payer: COMMERCIAL

## 2024-09-30 VITALS
RESPIRATION RATE: 18 BRPM | SYSTOLIC BLOOD PRESSURE: 105 MMHG | TEMPERATURE: 96.8 F | WEIGHT: 150.68 LBS | BODY MASS INDEX: 23.6 KG/M2 | OXYGEN SATURATION: 98 % | DIASTOLIC BLOOD PRESSURE: 68 MMHG | HEART RATE: 68 BPM

## 2024-09-30 DIAGNOSIS — Z12.11 COLON CANCER SCREENING: ICD-10-CM

## 2024-09-30 DIAGNOSIS — K64.9 HEMORRHOIDS, UNSPECIFIED HEMORRHOID TYPE: ICD-10-CM

## 2024-09-30 PROCEDURE — 3600000007 HC OR TIME - EACH INCREMENTAL 1 MINUTE - PROCEDURE LEVEL TWO: Performed by: ANESTHESIOLOGY

## 2024-09-30 PROCEDURE — 7100000009 HC PHASE TWO TIME - INITIAL BASE CHARGE: Performed by: ANESTHESIOLOGY

## 2024-09-30 PROCEDURE — 3700000002 HC GENERAL ANESTHESIA TIME - EACH INCREMENTAL 1 MINUTE: Performed by: ANESTHESIOLOGY

## 2024-09-30 PROCEDURE — 2720000007 HC OR 272 NO HCPCS: Performed by: ANESTHESIOLOGY

## 2024-09-30 PROCEDURE — 7100000010 HC PHASE TWO TIME - EACH INCREMENTAL 1 MINUTE: Performed by: ANESTHESIOLOGY

## 2024-09-30 PROCEDURE — 2500000004 HC RX 250 GENERAL PHARMACY W/ HCPCS (ALT 636 FOR OP/ED): Performed by: NURSE ANESTHETIST, CERTIFIED REGISTERED

## 2024-09-30 PROCEDURE — 45380 COLONOSCOPY AND BIOPSY: CPT | Performed by: SURGERY

## 2024-09-30 PROCEDURE — 3600000002 HC OR TIME - INITIAL BASE CHARGE - PROCEDURE LEVEL TWO: Performed by: ANESTHESIOLOGY

## 2024-09-30 PROCEDURE — 45388 COLONOSCOPY W/ABLATION: CPT | Performed by: SURGERY

## 2024-09-30 PROCEDURE — 3700000001 HC GENERAL ANESTHESIA TIME - INITIAL BASE CHARGE: Performed by: ANESTHESIOLOGY

## 2024-09-30 PROCEDURE — 2500000004 HC RX 250 GENERAL PHARMACY W/ HCPCS (ALT 636 FOR OP/ED): Performed by: ANESTHESIOLOGY

## 2024-09-30 PROCEDURE — 46930 DESTROY INTERNAL HEMORRHOIDS: CPT | Performed by: SURGERY

## 2024-09-30 PROCEDURE — 2500000005 HC RX 250 GENERAL PHARMACY W/O HCPCS: Performed by: NURSE ANESTHETIST, CERTIFIED REGISTERED

## 2024-09-30 RX ORDER — FENTANYL CITRATE 50 UG/ML
INJECTION, SOLUTION INTRAMUSCULAR; INTRAVENOUS AS NEEDED
Status: DISCONTINUED | OUTPATIENT
Start: 2024-09-30 | End: 2024-09-30

## 2024-09-30 RX ORDER — PHENYLEPHRINE HCL IN 0.9% NACL 0.4MG/10ML
SYRINGE (ML) INTRAVENOUS AS NEEDED
Status: DISCONTINUED | OUTPATIENT
Start: 2024-09-30 | End: 2024-09-30

## 2024-09-30 RX ORDER — LIDOCAINE HYDROCHLORIDE 10 MG/ML
INJECTION, SOLUTION EPIDURAL; INFILTRATION; INTRACAUDAL; PERINEURAL AS NEEDED
Status: DISCONTINUED | OUTPATIENT
Start: 2024-09-30 | End: 2024-09-30

## 2024-09-30 RX ORDER — MIDAZOLAM HYDROCHLORIDE 1 MG/ML
INJECTION INTRAMUSCULAR; INTRAVENOUS AS NEEDED
Status: DISCONTINUED | OUTPATIENT
Start: 2024-09-30 | End: 2024-09-30

## 2024-09-30 RX ORDER — SODIUM CHLORIDE, SODIUM LACTATE, POTASSIUM CHLORIDE, CALCIUM CHLORIDE 600; 310; 30; 20 MG/100ML; MG/100ML; MG/100ML; MG/100ML
20 INJECTION, SOLUTION INTRAVENOUS CONTINUOUS
Status: DISCONTINUED | OUTPATIENT
Start: 2024-09-30 | End: 2024-10-01 | Stop reason: HOSPADM

## 2024-09-30 RX ORDER — PROPOFOL 10 MG/ML
INJECTION, EMULSION INTRAVENOUS AS NEEDED
Status: DISCONTINUED | OUTPATIENT
Start: 2024-09-30 | End: 2024-09-30

## 2024-09-30 SDOH — HEALTH STABILITY: MENTAL HEALTH: CURRENT SMOKER: 0

## 2024-09-30 ASSESSMENT — PAIN - FUNCTIONAL ASSESSMENT
PAIN_FUNCTIONAL_ASSESSMENT: 0-10
PAIN_FUNCTIONAL_ASSESSMENT: 0-10

## 2024-09-30 ASSESSMENT — COLUMBIA-SUICIDE SEVERITY RATING SCALE - C-SSRS
2. HAVE YOU ACTUALLY HAD ANY THOUGHTS OF KILLING YOURSELF?: NO
6. HAVE YOU EVER DONE ANYTHING, STARTED TO DO ANYTHING, OR PREPARED TO DO ANYTHING TO END YOUR LIFE?: NO
1. IN THE PAST MONTH, HAVE YOU WISHED YOU WERE DEAD OR WISHED YOU COULD GO TO SLEEP AND NOT WAKE UP?: NO

## 2024-09-30 ASSESSMENT — PAIN SCALES - GENERAL
PAIN_LEVEL: 2
PAINLEVEL_OUTOF10: 0 - NO PAIN

## 2024-09-30 NOTE — DISCHARGE INSTRUCTIONS
Patient Instructions after a Colonoscopy      The anesthetics, sedatives or narcotics which were given to you today will be acting in your body for the next 24 hours, so you might feel a little sleepy or groggy.  This feeling should slowly wear off. Carefully read and follow the instructions.     You received sedation today:  - Do not drive or operate any machinery or power tools of any kind.   - No alcoholic beverages today, not even beer or wine.  - Do not make any important decisions or sign any legal documents.  - No over the counter medications that contain alcohol or that may cause drowsiness.  - Do not make any important decisions or sign any legal documents.  - Make sure you have someone with you for first 24 hours.    While it is common to experience mild to moderate abdominal distention, gas, or belching after your procedure, if any of these symptoms occur following discharge from the GI Lab or within one week of having your procedure, call the [facility] to be advised whether a visit to your nearest Urgent Care or Emergency Department is indicated.  Take this paper with you if you go.     - If you develop an allergic reaction to the medications that were given during your procedure such as difficulty breathing, rash, hives, severe nausea, vomiting or lightheadedness.  - If you experience chest pain, shortness of breath, severe abdominal pain, fevers and chills.  -If you develop signs and symptoms of bleeding such as blood in your spit, if your stools turn black, tarry, or bloody  - If you have not urinated within 8 hours following your procedure.  - If your IV site becomes painful, red, inflamed, or looks infected.    If you received a biopsy/polypectomy/sphincterotomy the following instructions apply below:    __ Do not use Aspirin containing products, non-steroidal medications or anti-coagulants for one week following your procedure. (Examples of these types of medications are: Advil,  Arthrotec, Aleve, Coumadin, Ecotrin, Heparin, Ibuprofen, Indocin, Motrin, Naprosyn, Nuprin, Plavix, Vioxx, and Voltarin, or their generic forms.  This list is not all-inclusive.  Check with your physician or pharmacist before resuming medications.)   __ Eat a soft diet today.  Avoid foods that are poorly digested for the next 24 hours.  These foods would include: nuts, beans, lettuce, red meats, and fried foods. Start with liquids and advance your diet as tolerated, gradually work up to eating solids.   __ Do not have a Barium Study or Enema for one week.    Your physician recommends the additional following instructions:    -You have a contact number available for emergencies. The signs and symptoms of potential delayed complications were discussed with you. You may return to normal activities tomorrow.  -Resume your previous diet.  -Continue your present medications.   -We are waiting for your pathology results.  -Your physician has recommended a repeat colonoscopy (date to be determined after pending pathology results are reviewed) for surveillance based on pathology results.  -The findings and recommendations have been discussed with you.  -The findings and recommendations were discussed with your family.  - Please see Medication Reconciliation Form for new medication/medications prescribed.       If you experience any problems or have any questions following discharge from the GI Lab, please call:        Nurse Signature                                                                        Date___________________                                                                            Patient/Responsible Party Signature                                        Date___________________

## 2024-09-30 NOTE — ANESTHESIA POSTPROCEDURE EVALUATION
Patient: Kati Lauren    Procedure Summary       Date: 09/30/24 Room / Location: [facility] OR    Anesthesia Start: 0753 Anesthesia Stop: 0830    Procedure: COLONOSCOPY Diagnosis:       Hemorrhoids, unspecified hemorrhoid type      Colon cancer screening    Scheduled Providers: Philippe Byers MD; Sathya Askew MD Responsible Provider: Sathya Askew MD    Anesthesia Type: MAC ASA Status: 1            Anesthesia Type: MAC    Vitals Value Taken Time   /68 09/30/24 0855   Temp 36 °C (96.8 °F) 09/30/24 0825   Pulse 68 09/30/24 0855   Resp 18 09/30/24 0855   SpO2 98 % 09/30/24 0855       Anesthesia Post Evaluation    Patient location during evaluation: PACU  Patient participation: complete - patient participated  Level of consciousness: awake  Pain score: 2  Pain management: adequate  Multimodal analgesia pain management approach  Airway patency: patent  Two or more strategies used to mitigate risk of obstructive sleep apnea  Cardiovascular status: acceptable  Respiratory status: acceptable  Hydration status: acceptable  Postoperative Nausea and Vomiting: none    No notable events documented.

## 2024-09-30 NOTE — ANESTHESIA PREPROCEDURE EVALUATION
Patient: Kati Lauren    Procedure Information       Date/Time: 09/30/24 0810    Scheduled providers: Philippe Byers MD; Sathya Askew MD    Procedure: COLONOSCOPY    Location: [facility] OR     01 +    There were no vitals filed for this visit.    Past Surgical History:   Procedure Laterality Date   • OTHER SURGICAL HISTORY N/A 08/26/2022    hemorrhoid light therapy     Past Medical History:   Diagnosis Date   • Allergic contact dermatitis due to plants, except food 08/14/2024   • Dysuria 06/30/2024   • Encounter for screening for malignant neoplasm of colon 01/27/2020    Encounter for screening colonoscopy   • Encounter for screening for malignant neoplasm of colon 09/30/2024   • Hemorrhoids    • Herpes labialis 01/25/2022   • Rectal bleeding    • Urinary incontinence        Current Outpatient Medications:   •  psyllium seed, with sugar, (FIBER ORAL), Take by mouth., Disp: , Rfl:   •  hydrocortisone-pramoxine (Analpram-HC) 2.5-1 % cream, Insert into the rectum 2 times a day. (Patient not taking: Reported on 6/4/2024), Disp: 30 g, Rfl: 2    Current Facility-Administered Medications:   •  lactated Ringer's infusion, 20 mL/hr, intravenous, Continuous, Carl Alexander MD, Last Rate: 20 mL/hr at 09/30/24 0733, 20 mL/hr at 09/30/24 0733  Prior to Admission medications    Medication Sig Start Date End Date Taking? Authorizing Provider   psyllium seed, with sugar, (FIBER ORAL) Take by mouth.   Yes Historical Provider, MD   hydrocortisone-pramoxine (Analpram-HC) 2.5-1 % cream Insert into the rectum 2 times a day.  Patient not taking: Reported on 6/4/2024 3/20/24   Sue Hdz PA-C     No Known Allergies  Social History     Tobacco Use   • Smoking status: Never   • Smokeless tobacco: Never   Substance Use Topics   • Alcohol use: Not Currently         Chemistry    Lab Results   Component Value Date/Time     09/12/2024 1242    K 4.0 09/12/2024 1242     09/12/2024 1242    CO2 28  "09/12/2024 1242    BUN 14 09/12/2024 1242    CREATININE 0.78 09/12/2024 1242    Lab Results   Component Value Date/Time    CALCIUM 10.0 09/12/2024 1242    ALKPHOS 72 09/12/2024 1242    AST 19 09/12/2024 1242    ALT 28 09/12/2024 1242    BILITOT 0.5 09/12/2024 1242          Lab Results   Component Value Date/Time    WBC 5.0 09/12/2024 1242    HGB 14.5 09/12/2024 1242    HCT 43.9 09/12/2024 1242     09/12/2024 1242     No results found for: \"PROTIME\", \"PTT\", \"INR\"  No results found for this or any previous visit (from the past 4464 hour(s)).  No results found for this or any previous visit from the past 1095 days.        Relevant Problems   No relevant active problems       Clinical information reviewed:   Tobacco  Allergies  Meds   Med Hx  Surg Hx   Fam Hx  Soc Hx        NPO Detail:  NPO/Void Status  Date of Last Liquid: 09/30/24  Time of Last Liquid: 1230  Date of Last Solid: 09/28/24         Physical Exam    Airway  Mallampati: II  TM distance: >3 FB  Neck ROM: full     Cardiovascular - normal exam     Dental    Pulmonary - normal exam     Abdominal - normal exam         Anesthesia Plan    History of general anesthesia?: yes  History of complications of general anesthesia?: no    ASA 1     MAC     The patient is not a current smoker.    intravenous induction   Anesthetic plan and risks discussed with patient.    Plan discussed with CRNA and attending.  "

## 2024-10-09 LAB
LABORATORY COMMENT REPORT: NORMAL
PATH REPORT.FINAL DX SPEC: NORMAL
PATH REPORT.GROSS SPEC: NORMAL
PATH REPORT.RELEVANT HX SPEC: NORMAL
PATH REPORT.TOTAL CANCER: NORMAL

## 2025-01-14 ENCOUNTER — OFFICE VISIT (OUTPATIENT)
Dept: GASTROENTEROLOGY | Facility: HOSPITAL | Age: 60
End: 2025-01-14
Payer: COMMERCIAL

## 2025-01-14 VITALS — BODY MASS INDEX: 22.91 KG/M2 | HEIGHT: 67 IN | WEIGHT: 146 LBS

## 2025-01-14 DIAGNOSIS — R10.12 LUQ PAIN: Primary | ICD-10-CM

## 2025-01-14 DIAGNOSIS — K59.00 CONSTIPATION, UNSPECIFIED CONSTIPATION TYPE: ICD-10-CM

## 2025-01-14 DIAGNOSIS — R14.2 BELCHING: ICD-10-CM

## 2025-01-14 PROCEDURE — 99213 OFFICE O/P EST LOW 20 MIN: CPT

## 2025-01-14 PROCEDURE — 99203 OFFICE O/P NEW LOW 30 MIN: CPT

## 2025-01-14 PROCEDURE — 3008F BODY MASS INDEX DOCD: CPT

## 2025-01-14 RX ORDER — FAMOTIDINE 40 MG/1
40 TABLET, FILM COATED ORAL DAILY
Qty: 30 TABLET | Refills: 11 | Status: SHIPPED | OUTPATIENT
Start: 2025-01-14 | End: 2026-01-14

## 2025-01-14 ASSESSMENT — ENCOUNTER SYMPTOMS
ANAL BLEEDING: 0
BLOOD IN STOOL: 0
COUGH: 0
APPETITE CHANGE: 0
SHORTNESS OF BREATH: 0
DIARRHEA: 0
CONSTIPATION: 1
ABDOMINAL PAIN: 1
RECTAL PAIN: 0
TROUBLE SWALLOWING: 0
NAUSEA: 0
FEVER: 0
FATIGUE: 0
CHILLS: 0
VOMITING: 0
ABDOMINAL DISTENTION: 0

## 2025-01-14 NOTE — ASSESSMENT & PLAN NOTE
-Recommend patient continue her daily fiber supplement and start MiraLAX instead of Swiss rizwana supplement with senna

## 2025-01-14 NOTE — PATIENT INSTRUCTIONS
I recommend taking 1 capful of Miralax daily in 8 oz of liquid.  This is to help move bowels and soften stool.  They also make Miralax gummies. You may try this instead of the swiss rizwana   Try to minimize acidic foods such as citrus fruits, tomatoes, and pop. Also try to avoid chocolate, peppermint, alcohol, and caffeine.  Avoid eating within 2-3 hours of lying down.   Eat more frequent smaller meals instead of a few large meals.  You can prop the head of your bed up when you are sleeping to decrease reflux.  Please take Pepcid 30-60 minutes before a meal daily.  This is to help calm stomach acid.  Notify me in 1 month if not better.

## 2025-01-14 NOTE — PROGRESS NOTES
Subjective     History of Present Illness:   Kati Lauren is a 59 y.o. female with no significant PMHx who presents to GI clinic for further evaluation of GERD    Today, patient here for GERD.  Has gas bubble up to her shoulders, can make herself belch if pressing LUQ, has LUQ pressure/pain  Has had flare of GERD for the past 3 weeks.  Tried 20 mg prilosec and nexium, gas-x, which didn't work.  Sometimes feels acid reflux.  Has been stressed. Denies NSAID use.  Takes daily fiber supplement and swiss rizwana laxative daily.  Moves bowels daily with normal formed stools. Otherwise will have constipation.   Is going to PT for shoulder injury and neck nerve pain.  Denies constipation, diarrhea,  melena, hematochezia, dysphagia, unintentional weight loss    Denies ETOH, smoking, marijuana  Denies fxh GI cancer or IBD  Abdominal Surgeries: denies    Last colonoscopy 9/2024 Dr. Byers: Mild localized erythematous, friable mucosa cecum-clips placed, mild inflammation in cecum-clip placed, mild localized erythema rectum, 3 external and internal prolapsing hemorrhoids-ablated/HET.  Negative pathology  Denies EGD       Past Medical History  As per HPI.     Social History  she  reports that she has never smoked. She has never used smokeless tobacco. She reports that she does not currently use alcohol. She reports that she does not use drugs.     Family History  her family history is not on file.     Review of Systems  Review of Systems   Constitutional:  Negative for appetite change, chills, fatigue and fever.   HENT:  Negative for trouble swallowing.    Respiratory:  Negative for cough and shortness of breath.    Gastrointestinal:  Positive for abdominal pain (LUQ) and constipation. Negative for abdominal distention, anal bleeding, blood in stool, diarrhea, nausea, rectal pain and vomiting.       Allergies  No Known Allergies    Medications  Current Outpatient Medications   Medication Instructions    famotidine (PEPCID) 40 mg,  oral, Daily    hydrocortisone-pramoxine (Analpram-HC) 2.5-1 % cream rectal, 2 times daily    psyllium seed, with sugar, (FIBER ORAL) oral        Objective   There were no vitals taken for this visit.   Physical Exam  Constitutional:       Appearance: Normal appearance. She is normal weight.   HENT:      Mouth/Throat:      Mouth: Mucous membranes are dry.      Pharynx: Oropharynx is clear.   Cardiovascular:      Rate and Rhythm: Normal rate and regular rhythm.   Pulmonary:      Effort: Pulmonary effort is normal.      Breath sounds: Normal breath sounds. No wheezing or rhonchi.   Abdominal:      General: Abdomen is flat. Bowel sounds are normal. There is no distension.      Palpations: Abdomen is soft. There is no hepatomegaly.      Tenderness: There is no abdominal tenderness. There is no guarding or rebound. Negative signs include Craft's sign.      Hernia: No hernia is present.   Musculoskeletal:         General: Normal range of motion.   Skin:     General: Skin is warm and dry.   Neurological:      General: No focal deficit present.      Mental Status: She is alert and oriented to person, place, and time.   Psychiatric:         Mood and Affect: Mood normal.         Behavior: Behavior normal.           Lab Results   Component Value Date    WBC 5.0 09/12/2024    HGB 14.5 09/12/2024    HCT 43.9 09/12/2024     09/12/2024     Lab Results   Component Value Date     09/12/2024    K 4.0 09/12/2024     09/12/2024    CO2 28 09/12/2024    BUN 14 09/12/2024    CREATININE 0.78 09/12/2024    CALCIUM 10.0 09/12/2024    PROT 7.4 09/12/2024    BILITOT 0.5 09/12/2024    ALKPHOS 72 09/12/2024    ALT 28 09/12/2024    AST 19 09/12/2024    GLUCOSE 90 09/12/2024           Kati Lauren is a 59 y.o. female who presents to GI clinic for LUQ pain and belching.    Constipation  -Recommend patient continue her daily fiber supplement and start MiraLAX instead of Swiss rizwana supplement with senna     LUQ pain  - start 40  mg daily pepcid  - antireflux regimen  - consider EGD if not better in 1 month         Deysi Matthew, APRN-CNP

## 2025-03-22 ENCOUNTER — APPOINTMENT (OUTPATIENT)
Dept: RADIOLOGY | Facility: HOSPITAL | Age: 60
End: 2025-03-22
Payer: COMMERCIAL

## 2025-03-22 ENCOUNTER — HOSPITAL ENCOUNTER (OUTPATIENT)
Facility: HOSPITAL | Age: 60
Setting detail: OBSERVATION
Discharge: HOME | End: 2025-03-22
Attending: EMERGENCY MEDICINE | Admitting: STUDENT IN AN ORGANIZED HEALTH CARE EDUCATION/TRAINING PROGRAM
Payer: COMMERCIAL

## 2025-03-22 ENCOUNTER — APPOINTMENT (OUTPATIENT)
Dept: CARDIOLOGY | Facility: HOSPITAL | Age: 60
End: 2025-03-22
Payer: COMMERCIAL

## 2025-03-22 VITALS
HEIGHT: 68 IN | DIASTOLIC BLOOD PRESSURE: 63 MMHG | SYSTOLIC BLOOD PRESSURE: 103 MMHG | TEMPERATURE: 98.6 F | OXYGEN SATURATION: 97 % | HEART RATE: 65 BPM | RESPIRATION RATE: 18 BRPM | BODY MASS INDEX: 22.43 KG/M2 | WEIGHT: 148 LBS

## 2025-03-22 DIAGNOSIS — R07.9 CHEST PAIN, UNSPECIFIED TYPE: ICD-10-CM

## 2025-03-22 DIAGNOSIS — M79.2 RADICULAR PAIN IN LEFT ARM: Primary | ICD-10-CM

## 2025-03-22 LAB
ALBUMIN SERPL BCP-MCNC: 4.3 G/DL (ref 3.4–5)
ALP SERPL-CCNC: 75 U/L (ref 33–110)
ALT SERPL W P-5'-P-CCNC: 31 U/L (ref 7–45)
ANION GAP SERPL CALC-SCNC: 12 MMOL/L (ref 10–20)
APTT PPP: 29 SECONDS (ref 26–36)
AST SERPL W P-5'-P-CCNC: 23 U/L (ref 9–39)
BASOPHILS # BLD AUTO: 0.04 X10*3/UL (ref 0–0.1)
BASOPHILS NFR BLD AUTO: 0.6 %
BILIRUB SERPL-MCNC: 0.5 MG/DL (ref 0–1.2)
BUN SERPL-MCNC: 19 MG/DL (ref 6–23)
CALCIUM SERPL-MCNC: 9.6 MG/DL (ref 8.6–10.3)
CARDIAC TROPONIN I PNL SERPL HS: 3 NG/L (ref 0–13)
CARDIAC TROPONIN I PNL SERPL HS: 3 NG/L (ref 0–13)
CHLORIDE SERPL-SCNC: 107 MMOL/L (ref 98–107)
CO2 SERPL-SCNC: 24 MMOL/L (ref 21–32)
CREAT SERPL-MCNC: 0.75 MG/DL (ref 0.5–1.05)
D DIMER PPP FEU-MCNC: <215 NG/ML FEU
EGFRCR SERPLBLD CKD-EPI 2021: >90 ML/MIN/1.73M*2
EOSINOPHIL # BLD AUTO: 0.05 X10*3/UL (ref 0–0.7)
EOSINOPHIL NFR BLD AUTO: 0.7 %
ERYTHROCYTE [DISTWIDTH] IN BLOOD BY AUTOMATED COUNT: 12.5 % (ref 11.5–14.5)
GLUCOSE SERPL-MCNC: 86 MG/DL (ref 74–99)
HCT VFR BLD AUTO: 42 % (ref 36–46)
HGB BLD-MCNC: 14.2 G/DL (ref 12–16)
IMM GRANULOCYTES # BLD AUTO: 0.01 X10*3/UL (ref 0–0.7)
IMM GRANULOCYTES NFR BLD AUTO: 0.1 % (ref 0–0.9)
INR PPP: 1 (ref 0.9–1.1)
LYMPHOCYTES # BLD AUTO: 1.79 X10*3/UL (ref 1.2–4.8)
LYMPHOCYTES NFR BLD AUTO: 26.5 %
MAGNESIUM SERPL-MCNC: 2.01 MG/DL (ref 1.6–2.4)
MCH RBC QN AUTO: 29.2 PG (ref 26–34)
MCHC RBC AUTO-ENTMCNC: 33.8 G/DL (ref 32–36)
MCV RBC AUTO: 86 FL (ref 80–100)
MONOCYTES # BLD AUTO: 0.36 X10*3/UL (ref 0.1–1)
MONOCYTES NFR BLD AUTO: 5.3 %
NEUTROPHILS # BLD AUTO: 4.5 X10*3/UL (ref 1.2–7.7)
NEUTROPHILS NFR BLD AUTO: 66.8 %
NRBC BLD-RTO: 0 /100 WBCS (ref 0–0)
PLATELET # BLD AUTO: 286 X10*3/UL (ref 150–450)
POTASSIUM SERPL-SCNC: 3.7 MMOL/L (ref 3.5–5.3)
PROT SERPL-MCNC: 7.1 G/DL (ref 6.4–8.2)
PROTHROMBIN TIME: 11.2 SECONDS (ref 9.8–12.4)
RBC # BLD AUTO: 4.86 X10*6/UL (ref 4–5.2)
SODIUM SERPL-SCNC: 139 MMOL/L (ref 136–145)
WBC # BLD AUTO: 6.8 X10*3/UL (ref 4.4–11.3)

## 2025-03-22 PROCEDURE — 2550000001 HC RX 255 CONTRASTS: Performed by: EMERGENCY MEDICINE

## 2025-03-22 PROCEDURE — 2500000001 HC RX 250 WO HCPCS SELF ADMINISTERED DRUGS (ALT 637 FOR MEDICARE OP): Performed by: STUDENT IN AN ORGANIZED HEALTH CARE EDUCATION/TRAINING PROGRAM

## 2025-03-22 PROCEDURE — 73030 X-RAY EXAM OF SHOULDER: CPT | Mod: LEFT SIDE | Performed by: RADIOLOGY

## 2025-03-22 PROCEDURE — 36415 COLL VENOUS BLD VENIPUNCTURE: CPT | Performed by: EMERGENCY MEDICINE

## 2025-03-22 PROCEDURE — 84484 ASSAY OF TROPONIN QUANT: CPT

## 2025-03-22 PROCEDURE — 71275 CT ANGIOGRAPHY CHEST: CPT

## 2025-03-22 PROCEDURE — 71046 X-RAY EXAM CHEST 2 VIEWS: CPT

## 2025-03-22 PROCEDURE — 99285 EMERGENCY DEPT VISIT HI MDM: CPT | Mod: 25 | Performed by: EMERGENCY MEDICINE

## 2025-03-22 PROCEDURE — 80053 COMPREHEN METABOLIC PANEL: CPT

## 2025-03-22 PROCEDURE — 2500000004 HC RX 250 GENERAL PHARMACY W/ HCPCS (ALT 636 FOR OP/ED): Performed by: STUDENT IN AN ORGANIZED HEALTH CARE EDUCATION/TRAINING PROGRAM

## 2025-03-22 PROCEDURE — G0378 HOSPITAL OBSERVATION PER HR: HCPCS

## 2025-03-22 PROCEDURE — 85379 FIBRIN DEGRADATION QUANT: CPT | Performed by: STUDENT IN AN ORGANIZED HEALTH CARE EDUCATION/TRAINING PROGRAM

## 2025-03-22 PROCEDURE — 96372 THER/PROPH/DIAG INJ SC/IM: CPT | Performed by: STUDENT IN AN ORGANIZED HEALTH CARE EDUCATION/TRAINING PROGRAM

## 2025-03-22 PROCEDURE — 93005 ELECTROCARDIOGRAM TRACING: CPT

## 2025-03-22 PROCEDURE — 73030 X-RAY EXAM OF SHOULDER: CPT | Mod: LT

## 2025-03-22 PROCEDURE — 36415 COLL VENOUS BLD VENIPUNCTURE: CPT

## 2025-03-22 PROCEDURE — 85610 PROTHROMBIN TIME: CPT | Performed by: EMERGENCY MEDICINE

## 2025-03-22 PROCEDURE — 85025 COMPLETE CBC W/AUTO DIFF WBC: CPT

## 2025-03-22 PROCEDURE — 2500000004 HC RX 250 GENERAL PHARMACY W/ HCPCS (ALT 636 FOR OP/ED): Performed by: EMERGENCY MEDICINE

## 2025-03-22 PROCEDURE — 83735 ASSAY OF MAGNESIUM: CPT

## 2025-03-22 PROCEDURE — 71046 X-RAY EXAM CHEST 2 VIEWS: CPT | Mod: LEFT SIDE | Performed by: RADIOLOGY

## 2025-03-22 PROCEDURE — 71275 CT ANGIOGRAPHY CHEST: CPT | Performed by: RADIOLOGY

## 2025-03-22 RX ORDER — CYCLOBENZAPRINE HCL 10 MG
10 TABLET ORAL 2 TIMES DAILY PRN
Qty: 5 TABLET | Refills: 0 | Status: SHIPPED | OUTPATIENT
Start: 2025-03-22

## 2025-03-22 RX ORDER — ENOXAPARIN SODIUM 100 MG/ML
40 INJECTION SUBCUTANEOUS EVERY 24 HOURS
Status: DISCONTINUED | OUTPATIENT
Start: 2025-03-22 | End: 2025-03-22 | Stop reason: HOSPADM

## 2025-03-22 RX ORDER — PREDNISONE 20 MG/1
40 TABLET ORAL ONCE
Status: COMPLETED | OUTPATIENT
Start: 2025-03-22 | End: 2025-03-22

## 2025-03-22 RX ORDER — PREDNISONE 10 MG/1
TABLET ORAL
Qty: 20 TABLET | Refills: 0 | Status: SHIPPED | OUTPATIENT
Start: 2025-03-22 | End: 2025-03-30

## 2025-03-22 RX ORDER — FAMOTIDINE 20 MG/1
40 TABLET, FILM COATED ORAL DAILY
Status: DISCONTINUED | OUTPATIENT
Start: 2025-03-22 | End: 2025-03-22 | Stop reason: HOSPADM

## 2025-03-22 RX ADMIN — IOHEXOL 75 ML: 350 INJECTION, SOLUTION INTRAVENOUS at 13:46

## 2025-03-22 RX ADMIN — PREDNISONE 40 MG: 20 TABLET ORAL at 16:31

## 2025-03-22 ASSESSMENT — PAIN SCALES - GENERAL
PAINLEVEL_OUTOF10: 10 - WORST POSSIBLE PAIN
PAINLEVEL_OUTOF10: 5 - MODERATE PAIN

## 2025-03-22 ASSESSMENT — COLUMBIA-SUICIDE SEVERITY RATING SCALE - C-SSRS
2. HAVE YOU ACTUALLY HAD ANY THOUGHTS OF KILLING YOURSELF?: NO
1. IN THE PAST MONTH, HAVE YOU WISHED YOU WERE DEAD OR WISHED YOU COULD GO TO SLEEP AND NOT WAKE UP?: NO
6. HAVE YOU EVER DONE ANYTHING, STARTED TO DO ANYTHING, OR PREPARED TO DO ANYTHING TO END YOUR LIFE?: NO

## 2025-03-22 ASSESSMENT — PAIN DESCRIPTION - DESCRIPTORS
DESCRIPTORS: SHOOTING;STABBING
DESCRIPTORS: RADIATING

## 2025-03-22 ASSESSMENT — PAIN - FUNCTIONAL ASSESSMENT: PAIN_FUNCTIONAL_ASSESSMENT: 0-10

## 2025-03-22 ASSESSMENT — PAIN DESCRIPTION - ORIENTATION: ORIENTATION: LEFT

## 2025-03-22 ASSESSMENT — PAIN DESCRIPTION - PAIN TYPE: TYPE: ACUTE PAIN

## 2025-03-22 ASSESSMENT — PAIN DESCRIPTION - FREQUENCY: FREQUENCY: INTERMITTENT

## 2025-03-22 ASSESSMENT — PAIN DESCRIPTION - LOCATION: LOCATION: CHEST

## 2025-03-22 NOTE — ED TRIAGE NOTES
As provider-in-triage, I performed a medical screening history and physical exam on this patient.  HISTORY OF PRESENT ILLNESS  Patient is a 51-year-old female sent to the ED with concern for chest pain.  Patient states this is been going on for months.  Pain is substernal and radiates down her left arm into her jaw.  Patient did see a doctor for this a couple months ago who told her it could be GERD and started her on Pepcid.  Patient is been taking Pepcid and aspirin at home with no relief of symptoms.  Starting today the pain was radiating to her left shoulder blade.  Pain is rated 7/10.  She denies any shortness of breath, cough, congestion, rhinorrhea, sore throat, hemoptysis nausea, vomiting, or abdominal pain.  Denies any cardiac risk factors or previous cardiac history.  Denies any calf pain or swelling, hormone therapy, cancer, previous blood clot, recent surgery.  She did recently have a 2-hour plane ride from [place] last week.     PHYSICAL EXAM  Vital Signs reviewed.  Cardiovascular: Regular rate and rhythm. No m/g/r  Respiratory: No respiratory distress. No accessory muscle use. Breath sounds are present and equal b/l. No adventitious sounds.        MDM  Workup initiated.  EKG reviewed, no STEMI.  Pt stable pending bed availability and further evaluation. Please see subsequent provider note for further details and disposition.         I evaluated this patient in triage with the RN. Due to the patients complaint labs and or imaging were ordered by myself in an attempt to expedite patient care however I am not participating in care after evaluation. This is a preliminary assessment. Pt does not appear in acute distress at this time. They will have a full evaluation as soon as possible. They will be cared for by another provider who will possibly order more labs, imaging or interventions. Pt did not have a full ROS or PE completed by myself however below is a summary with reasons for orders.  For the  remainder of the patient's workup and ED course, please refer to the main ED provider note. We discussed need for diagnostic testing including laboratory studies and imaging.  We also discussed that patient may be asked to wait in the waiting room while these tests are pending.  They understand that if they choose to leave without having the testing completed or resulted that we cannot rule out acute life threatening illnesses and the risks involved could lead to worsening condition, permanent disability or even death.

## 2025-03-22 NOTE — DISCHARGE INSTRUCTIONS
You were seen emergency room today for evaluation of chest pain and shoulder pain rating down your left arm.  Admission was recommended for further cardiac testing however an outpatient stress test would be reasonable.  Please follow-up with your primary care provider regarding this emergency room visit.  Please return if you have worsening symptoms recommend other concerns.  Please trial steroid taper and as needed Flexeril for radicular pain.  Please return if you have any worsening symptoms or any other concerns.

## 2025-03-22 NOTE — ED PROVIDER NOTES
History/Exam limitations: none.   Additional history was obtained from patient, spouse/SO, and past medical records.          HPI:    Kati Lauren is a 59 y.o. female PMH GERD presenting for evaluation of chest pain.  Patient states over the last 2 months approximate she is been having left-sided chest pain has been relatively consistent.  She did have an improvement in her symptoms however it came back around Tuesday of this past week and has  been constant since, 4 days ago.  She states that it has been relatively consistent, left-sided, pleuritic with radiation down her left arm.  She states that she feels it in her left lateral chest and around the left side underneath her arm.  Denies any tearing type sensation through her back or in her midsternal area.  States that she has not noticed any significant shortness of breath but  pain is pleuritic.  Began having jaw discomfort today so came in to be seen.  Nonexertional, no diaphoresis.  Jaw discomfort resolved.  No headache, vision changes, fever, chills, cough, congestion, unilateral leg swelling.  No weakness or numbness.  Does have some discomfort in her left trapezius musculature as well.  Reports has been taking Motrin and aspirin.  States that she was told she has high cholesterol but has not yet seen her primary care provider regarding this.  Saw her gastroenterology at onset of symptoms and was started on Pepcid.  Was not markedly improving symptoms but did seem to help things.  States that she is attempting to schedule an EGD.         Physical Exam:  ED Triage Vitals [03/22/25 1201]   Temperature Heart Rate Respirations BP   37 °C (98.6 °F) 65 18 (!) 125/108      Pulse Ox Temp Source Heart Rate Source Patient Position   99 % Temporal Monitor --      BP Location FiO2 (%)     -- --        GEN:      Alert, NAD  Eyes:       PERRL, EOMI  HENT:      NC/AT, OP clear, airway patent, MM  CV:      RRR, no MRG, no LE pitting edema, 2+ radial and pedal  pulses  PULM:     CTAB, no w/r/r, easy WOB, symmetric chest rise  ABD:      Soft, NT, ND, no masses, BS +  NEURO:   A/Ox4, CN II-XII intact, strength 5/5 BUE shoulder flexion extension/elbow flexion extension/wrist flexion extension/ strength, SILT, FNF normal b/l, no pronator drift  MSK:      FROM, no joint deformities or swelling, no e/o trauma, no midline CTL spine tenderness or step-offs, tenderness to the left trapezius and infraspinatus musculature with reproducibility of lateral back discomfort  SKIN:       Warm and dry  PSYCH:    Appropriate mood and affect         MDM/ED Course:   Kati Lauren is a 59 y.o. female PMH GERD presenting for evaluation of chest pain.  Triage vitals markable for elevated diastolic blood pressure which resolved without intervention.  Exam as documented above.  Differential includes ACS, PNA, PE, aortic dissection/pathology, pneumothorax, pericardial effusion, pericarditis, myocarditis, GERD, musculoskeletal pain, pleurisy.  I have a low sufficient for aortic dissection given patient's pain in the lateral aspect of her back is more radicular in nature, reproducible, no tearing type sensation between chest and back pain, is not in the mid left is quite lateral.  Additionally reassuring that patient has had symptoms for 2 months, has equal pulses, is nontoxic-appearing with minimal risk factors for acute aortic pathology.  Unlikely myocarditis given history, exam, vitals.  Unlikely pericarditis given no positional component, no friction rub, EKG as below.  IV placed and labs drawn.  CBC with no leukocytosis or significant anemia.  Chemistry reassuring.  Troponin negative at 3 ng/L x 2.  A left shoulder and chest x-ray were obtained from triage, possible subchondral cyst on the left clavicle chest x-ray with no acute findings.  CT angio chest for PE displays no significant bony lesion, no PE, no evidence of pneumonia.  I am unsure why patient had the discomfort in the jaw today.   Was at rest while working.  Given this I did discuss option of admission for stress testing given patient's untreated hyperlipidemia, borderline between low risk and moderate risk heart score.  Patient would not like to be admitted and I believe this is reasonable given patient is well-appearing with negative troponins, minimal risk factors, relatively chronic symptoms.  Reports she has not had any exertional symptoms and works out every morning including cardio which is reassuring.  On reassessment does have reproducible pain left trapezius and left clavicular area, no active radiation of pain down the arm.  Neurovasc intact left upper extremity.  Patient plans to follow-up with the primary care provider to discuss stress testing.  States that she has had improved symptoms with oral steroids in the past, p.o. prednisone taper provided as well as as needed Flexeril.  Patient was accepted by the observation team for admission however requested deferring admission and admission was canceled.  The observation team did appear to order a D-dimer which ultimately resulted undetectably low.  Explained findings, exam/study results, the importance of follow up and the plan moving forward; patient and/or caregiver verbalized understanding and agreement. All questions were answered and no new questions at this time. Patient will be discharged with strict return precautions, follow up plan with PCP.    EKG reviewed by me: 12:05 PM normal sinus rhythm, rate 70, borderline left however normal axis, normal intervals, no STEMI, no ectopy, no acute ischemic changes, no prior for comparison.     Diagnoses as of 03/22/25 1842   Radicular pain in left arm   Chest pain, unspecified type         Chronic medical conditions affecting care listed in MDM. I independently reviewed imaging studies and agreed with radiology reads. I reviewed recent and relevant outside records including PCP notes, Prior discharge summaries, and prior radiology  reports.    Procedure  Procedures    Diagnosis:   1.  Chest pain  2.  Left upper extremity radicular pain    Dispo: Discharged in stable condition      Disclaimer: Portions of this note were dictated by speech recognition. An attempt at proof reading was made to minimize errors. Minor errors in transcription may be present.  Please call if questions.     Darrell Rivera MD  03/22/25 5839

## 2025-03-22 NOTE — ED TRIAGE NOTES
Patient c/o intermittent left sided chest pain that radiates into the left arm and into the left jaw. Pt denies SOB. Pt alert and oriented x 4.

## 2025-03-24 LAB
ATRIAL RATE: 70 BPM
P AXIS: 67 DEGREES
P OFFSET: 209 MS
P ONSET: 159 MS
PR INTERVAL: 134 MS
Q ONSET: 226 MS
QRS COUNT: 12 BEATS
QRS DURATION: 84 MS
QT INTERVAL: 374 MS
QTC CALCULATION(BAZETT): 403 MS
QTC FREDERICIA: 393 MS
R AXIS: -15 DEGREES
T AXIS: 65 DEGREES
T OFFSET: 413 MS
VENTRICULAR RATE: 70 BPM

## 2025-04-02 ENCOUNTER — OFFICE VISIT (OUTPATIENT)
Dept: GASTROENTEROLOGY | Facility: CLINIC | Age: 60
End: 2025-04-02
Payer: COMMERCIAL

## 2025-04-02 VITALS — BODY MASS INDEX: 22.73 KG/M2 | WEIGHT: 150 LBS | OXYGEN SATURATION: 98 % | HEIGHT: 68 IN | HEART RATE: 77 BPM

## 2025-04-02 DIAGNOSIS — R10.12 LUQ PAIN: Primary | ICD-10-CM

## 2025-04-02 DIAGNOSIS — R14.2 BELCHING: ICD-10-CM

## 2025-04-02 PROCEDURE — 3008F BODY MASS INDEX DOCD: CPT

## 2025-04-02 PROCEDURE — 99214 OFFICE O/P EST MOD 30 MIN: CPT

## 2025-04-02 RX ORDER — METHYLPREDNISOLONE 4 MG/1
TABLET ORAL
COMMUNITY
Start: 2025-01-08

## 2025-04-02 RX ORDER — BENZONATATE 200 MG/1
CAPSULE ORAL
COMMUNITY
Start: 2024-11-25

## 2025-04-02 ASSESSMENT — ENCOUNTER SYMPTOMS
BLOOD IN STOOL: 0
FATIGUE: 0
TROUBLE SWALLOWING: 0
CHILLS: 0
APPETITE CHANGE: 0
ABDOMINAL DISTENTION: 0
FEVER: 0
CONSTIPATION: 0
DIARRHEA: 0
ANAL BLEEDING: 0
RECTAL PAIN: 0
NAUSEA: 0
CHEST TIGHTNESS: 1
SHORTNESS OF BREATH: 0
VOMITING: 0
COUGH: 0
ABDOMINAL PAIN: 1

## 2025-04-02 NOTE — ASSESSMENT & PLAN NOTE
Consider esophagitis, gastritis, duodenitis, PUD.  Possible cardiac or non-GI related   -20 mg Voquenza samples  -Schedule EGD  -PCP referral placed    Follow-up annually or as needed

## 2025-04-02 NOTE — PATIENT INSTRUCTIONS
Please schedule your upper endoscopy.  Plan to have a ride for this procedure since it involves sedation.  Please do not eat or drink anything after midnight the night prior to this procedure.  I will give you Voquezna samples.    I recommend [facility] [phone] for primary care or [facility] [phone]

## 2025-04-02 NOTE — PROGRESS NOTES
Subjective     History of Present Illness:   Kati Lauren is a 59 y.o. female with no significant PMHx who presents to GI clinic for follow-up  Last seen 1/2025 for constipation, continue fiber supplement, start daily MiraLAX.  LUQ pain, 40 mg Pepcid, consider EGD    Today,  Patient states pepcid is not doing anything.  Has left upper abdominal/CP radiating to her left upper back, neck and shoulder.  Lots of belching and is very uncomfortable.    Tried 20 mg prilosec and nexium, gas-x, which didn't work.  Denies NSAID use.  Went to the ED since last visit and was recommended to have a cardiac stress test.  Needs a new PCP.  Feels she needs an MRI.  Is going to PT for shoulder injury and neck nerve pain.  Denies constipation, diarrhea,  melena, hematochezia, dysphagia, unintentional weight loss    Denies ETOH, smoking, marijuana  Denies fxh GI cancer or IBD  Abdominal Surgeries: denies    Last colonoscopy 9/2024 Dr. Byers: Mild localized erythematous, friable mucosa cecum-clips placed, mild inflammation in cecum-clip placed, mild localized erythema rectum, 3 external and internal prolapsing hemorrhoids-ablated/HET.  Negative pathology  Denies EGD       Past Medical History  As per HPI.     Social History  she  reports that she has never smoked. She has never used smokeless tobacco. She reports that she does not currently use alcohol. She reports that she does not use drugs.     Family History  her family history is not on file.     Review of Systems  Review of Systems   Constitutional:  Negative for appetite change, chills, fatigue and fever.   HENT:  Negative for trouble swallowing.    Respiratory:  Positive for chest tightness. Negative for cough and shortness of breath.    Gastrointestinal:  Positive for abdominal pain (LUQ). Negative for abdominal distention, anal bleeding, blood in stool, constipation, diarrhea, nausea, rectal pain and vomiting.       Allergies  No Known Allergies    Medications  Current  Outpatient Medications   Medication Instructions    benzonatate (Tessalon) 200 mg capsule TAKE 1 CAPSULE BY MOUTH THREE TIMES A DAY AS NEEDED FOR UP TO 7 DAYS.    cyclobenzaprine (FLEXERIL) 10 mg, oral, 2 times daily PRN    famotidine (PEPCID) 40 mg, oral, Daily    hydrocortisone-pramoxine (Analpram-HC) 2.5-1 % cream rectal, 2 times daily    Medrol, Joe, 4 mg tablets Take by mouth.    psyllium seed, with sugar, (FIBER ORAL) oral        Objective   Visit Vitals  Pulse 77      Physical Exam      Lab Results   Component Value Date    WBC 6.8 03/22/2025    WBC 5.0 09/12/2024    HGB 14.2 03/22/2025    HGB 14.5 09/12/2024    HCT 42.0 03/22/2025    HCT 43.9 09/12/2024     03/22/2025     09/12/2024     Lab Results   Component Value Date     03/22/2025     09/12/2024    K 3.7 03/22/2025    K 4.0 09/12/2024     03/22/2025     09/12/2024    CO2 24 03/22/2025    CO2 28 09/12/2024    BUN 19 03/22/2025    BUN 14 09/12/2024    CREATININE 0.75 03/22/2025    CREATININE 0.78 09/12/2024    CALCIUM 9.6 03/22/2025    CALCIUM 10.0 09/12/2024    PROT 7.1 03/22/2025    PROT 7.4 09/12/2024    BILITOT 0.5 03/22/2025    BILITOT 0.5 09/12/2024    ALKPHOS 75 03/22/2025    ALKPHOS 72 09/12/2024    ALT 31 03/22/2025    ALT 28 09/12/2024    AST 23 03/22/2025    AST 19 09/12/2024    GLUCOSE 86 03/22/2025    GLUCOSE 90 09/12/2024           Kati Lauren is a 59 y.o. female who presents to GI clinic for LUQ pain and belching.    LUQ pain  Consider esophagitis, gastritis, duodenitis, PUD.  Possible cardiac or non-GI related   -20 mg Voquenza samples  -Schedule EGD  -PCP referral placed    Follow-up annually or as needed           Deysi Matthew, APRN-CNP

## 2025-04-23 ENCOUNTER — APPOINTMENT (OUTPATIENT)
Dept: GASTROENTEROLOGY | Facility: EXTERNAL LOCATION | Age: 60
End: 2025-04-23
Payer: COMMERCIAL

## 2025-04-23 DIAGNOSIS — R14.2 BELCHING: ICD-10-CM

## 2025-04-23 DIAGNOSIS — K21.9 GASTROESOPHAGEAL REFLUX DISEASE WITHOUT ESOPHAGITIS: Primary | ICD-10-CM

## 2025-04-23 DIAGNOSIS — R10.12 LUQ PAIN: ICD-10-CM

## 2025-04-23 PROCEDURE — 43239 EGD BIOPSY SINGLE/MULTIPLE: CPT | Performed by: INTERNAL MEDICINE

## 2025-04-23 PROCEDURE — 88305 TISSUE EXAM BY PATHOLOGIST: CPT

## 2025-04-23 PROCEDURE — 88305 TISSUE EXAM BY PATHOLOGIST: CPT | Performed by: PATHOLOGY

## 2025-04-23 PROCEDURE — 0753T DGTZ GLS MCRSCP SLD LEVEL IV: CPT

## 2025-04-23 NOTE — PROGRESS NOTES
Patient presents with symptoms of belching and other symptoms of pain in the chest and in the back and down the arm that seem more musculoskeletal in nature as she is able to change the character by movement, pressure and even lies on 3 tennis balls in her back.  At this point her upper endoscopy is essentially normal.  Biopsies were done for H. pylori.  I will continue her current acid related blocker and we will check biopsies for H. pylori.  I am trying to reassure her that most of her symptoms seem more musculoskeletal not gastrointestinal.  She is up-to-date on colonoscopy

## 2025-04-24 ENCOUNTER — LAB REQUISITION (OUTPATIENT)
Dept: LAB | Facility: HOSPITAL | Age: 60
End: 2025-04-24
Payer: COMMERCIAL

## 2025-04-24 DIAGNOSIS — R10.12 LEFT UPPER QUADRANT PAIN: ICD-10-CM

## 2025-04-25 ENCOUNTER — TELEPHONE (OUTPATIENT)
Dept: OBSTETRICS AND GYNECOLOGY | Facility: CLINIC | Age: 60
End: 2025-04-25
Payer: COMMERCIAL

## 2025-04-28 DIAGNOSIS — R53.83 OTHER FATIGUE: Primary | ICD-10-CM

## 2025-04-28 DIAGNOSIS — E78.00 ELEVATED CHOLESTEROL: ICD-10-CM

## 2025-04-28 NOTE — TELEPHONE ENCOUNTER
Patient called. She was seen in the ER recently for chest and arm pain. They wanted to do further workup with stress test/ECHO but she declined because she had to work. I explained that checking her estrogen levels will not help in this situation. She has been menopausal for several years and her estrogen levels should be low. She needs to get a work up for cardiac issues and as I have told her before she needs a PCP. I am willing to repeat her lipids and check a ferritin, iron and TSH as it has been over 6 months since those were done. At the end of our call I transferred her to our  to get names of PCPs that are taking new patients. I explained the indications for HRT and would not recommend it in her case. She definitely needs a further cardiac work up. She should go back to the ER is symptoms are worse or not improving.

## 2025-06-09 ENCOUNTER — APPOINTMENT (OUTPATIENT)
Dept: PRIMARY CARE | Facility: CLINIC | Age: 60
End: 2025-06-09
Payer: COMMERCIAL

## 2025-06-09 ENCOUNTER — OFFICE VISIT (OUTPATIENT)
Dept: PRIMARY CARE | Facility: CLINIC | Age: 60
End: 2025-06-09
Payer: COMMERCIAL

## 2025-06-09 VITALS
BODY MASS INDEX: 23.64 KG/M2 | WEIGHT: 156 LBS | OXYGEN SATURATION: 97 % | SYSTOLIC BLOOD PRESSURE: 111 MMHG | HEART RATE: 58 BPM | HEIGHT: 68 IN | DIASTOLIC BLOOD PRESSURE: 77 MMHG

## 2025-06-09 DIAGNOSIS — Z00.00 ROUTINE GENERAL MEDICAL EXAMINATION AT A HEALTH CARE FACILITY: Primary | ICD-10-CM

## 2025-06-09 DIAGNOSIS — G89.29 CHRONIC RIGHT SHOULDER PAIN: ICD-10-CM

## 2025-06-09 DIAGNOSIS — K59.00 CONSTIPATION, UNSPECIFIED CONSTIPATION TYPE: ICD-10-CM

## 2025-06-09 DIAGNOSIS — N95.9 POST MENOPAUSAL PROBLEMS: ICD-10-CM

## 2025-06-09 DIAGNOSIS — M25.511 CHRONIC RIGHT SHOULDER PAIN: ICD-10-CM

## 2025-06-09 PROCEDURE — 3008F BODY MASS INDEX DOCD: CPT | Performed by: PHYSICIAN ASSISTANT

## 2025-06-09 PROCEDURE — 99386 PREV VISIT NEW AGE 40-64: CPT | Performed by: PHYSICIAN ASSISTANT

## 2025-06-09 ASSESSMENT — ENCOUNTER SYMPTOMS
ABDOMINAL PAIN: 0
DIZZINESS: 0
NERVOUS/ANXIOUS: 0
DEPRESSION: 0
PSYCHIATRIC NEGATIVE: 1
PALPITATIONS: 0
ARTHRALGIAS: 1
OCCASIONAL FEELINGS OF UNSTEADINESS: 0
RESPIRATORY NEGATIVE: 1
VOMITING: 0
COUGH: 0
SHORTNESS OF BREATH: 0
HEMATOLOGIC/LYMPHATIC NEGATIVE: 1
CONSTIPATION: 1
ENDOCRINE NEGATIVE: 1
ALLERGIC/IMMUNOLOGIC NEGATIVE: 1
ACTIVITY CHANGE: 0
LOSS OF SENSATION IN FEET: 0
FATIGUE: 1
WHEEZING: 0
EYES NEGATIVE: 1
NEUROLOGICAL NEGATIVE: 1
NAUSEA: 0
HEADACHES: 0
BACK PAIN: 0

## 2025-06-09 ASSESSMENT — PATIENT HEALTH QUESTIONNAIRE - PHQ9
2. FEELING DOWN, DEPRESSED OR HOPELESS: NOT AT ALL
1. LITTLE INTEREST OR PLEASURE IN DOING THINGS: NOT AT ALL
SUM OF ALL RESPONSES TO PHQ9 QUESTIONS 1 AND 2: 0

## 2025-06-09 NOTE — PROGRESS NOTES
Subjective   Patient ID: Kati Lauren is a 59 y.o. female who presents for New Patient Visit (Pt states she is not sure why she is here today ).    HPI   Patient Kati Lauren 59 y.o. 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 - 3 grown children     specialists GYN   UTD dental and vision UTD     PMhx significant medical hx frozen shoulder-- right --     post menopausal sxs -x a few years - neck and shoulder pain on left - wt gain - low energy - constipation -- wishes to see some one who specializes in menopause -      also inquiring about a  BS monitor aida - pr is not diabetic or prediabetic but would like to have one to monitor her Blood sugars due to her other symptoms-- pt states she is aware that it ma not be covered    Has had all these sxs for a few years and just tried to self manage with healthy diet and exercise  Neck pain -- saw cori and had PT-- as well   Acid reflux --saw [facility] has EGD and colonoscopy     Social hx: etoh, no tobacco use, no illicit drug use   Watches diet and exercise spin triathlon - 2-3 hours per day   immunizations   FMhx: mother, father both alive and well   Past medical, surgical, social, and family history all reviewed   Colonoscopy and EGD - 6 months  at [facility]-- for GERD   Pt declines all vaccines       .patient states feeling well otherwise  denies fever, chills, N/V, headache, dizziness, CP, SOB, palpitations, edema, numbness, tingling, weakness  A chaperone was offered to the patient for the physical exam /  exam and was declined       Review of Systems   Constitutional:  Positive for fatigue. Negative for activity change.   HENT: Negative.     Eyes: Negative.    Respiratory: Negative.  Negative for cough, shortness of breath and wheezing.    Cardiovascular:  Negative for chest pain and  "palpitations.   Gastrointestinal:  Positive for constipation. Negative for abdominal pain, nausea and vomiting.        Acid reflux -    Endocrine: Negative.    Genitourinary: Negative.    Musculoskeletal:  Positive for arthralgias. Negative for back pain.        Hx neck pain and rigtht frozen shoulder   Chronic bilateral shoulder pain    Skin: Negative.  Negative for rash.   Allergic/Immunologic: Negative.    Neurological: Negative.  Negative for dizziness and headaches.   Hematological: Negative.    Psychiatric/Behavioral: Negative.  The patient is not nervous/anxious.        Objective   /77 (BP Location: Right arm, Patient Position: Sitting)   Pulse 58   Ht 1.727 m (5' 8\")   Wt 70.8 kg (156 lb)   SpO2 97%   BMI 23.72 kg/m²     Physical Exam  Vitals and nursing note reviewed.   Constitutional:       Appearance: Normal appearance. She is normal weight.   HENT:      Head: Normocephalic and atraumatic.      Right Ear: Tympanic membrane, ear canal and external ear normal.      Left Ear: Tympanic membrane, ear canal and external ear normal.      Nose: Nose normal.      Mouth/Throat:      Mouth: Mucous membranes are moist.      Pharynx: Oropharynx is clear.   Eyes:      Extraocular Movements: Extraocular movements intact.      Pupils: Pupils are equal, round, and reactive to light.   Cardiovascular:      Rate and Rhythm: Normal rate and regular rhythm.      Heart sounds: Normal heart sounds.   Pulmonary:      Effort: Pulmonary effort is normal.      Breath sounds: Normal breath sounds.   Abdominal:      General: Abdomen is flat. Bowel sounds are normal.      Palpations: Abdomen is soft.   Genitourinary:     Comments: Per GYN   Musculoskeletal:         General: Normal range of motion.      Cervical back: Normal range of motion and neck supple.   Skin:     General: Skin is warm and dry.   Neurological:      General: No focal deficit present.      Mental Status: She is alert and oriented to person, place, and " time.   Psychiatric:         Mood and Affect: Mood normal.         Behavior: Behavior normal.         Thought Content: Thought content normal.         Judgment: Judgment normal.         Assessment/Plan   Problem List Items Addressed This Visit       Constipation     Other Visit Diagnoses         Routine general medical examination at a health care facility    -  Primary    Relevant Medications    FreeStyle Flory 14 Day Sensor kit      Chronic right shoulder pain          Post menopausal problems        Relevant Orders    Referral to Obstetrics        Est/  -  patient stable and healthy  -  discussed healthy diet and exercise plan   -  continue medications as directed, SEs, risks and options discussed   -  f/u with specialists as directed   -  UTD on dental and vision exams, f/u as directed   -Vaccinations recommended today: Influenza, Prevnar,  Tdap-- declines all immunizations   -Follow-up annual exam in one year  -Colon cancer screening UTD 2024  -Follow-up in one week to discuss all results\    Menopausal sxs - low energy - shoulder pain -- advised pt that I do not specialize in menopause - but happy to refer her to Gyn - dayanara arnold - for further workup     Also requesting Flory -- pt aware that it may not be covered since no dx of hyperglycemia - pt says she still wishes flory       Gerd - stable sees GI --    Constipation for many years-  manges it with daily fiber and hydration anad exercise     complexity visit of 35+  minutes face-to-face with at least half of the time discussing  Results of my examination, clinical findings, impression and diagnoses discussed with the patient as well as results of the latest test/lab work. The patient was in agreement with the plan and verbalized a good understanding of instructions and plans for treatment. At the end of the visit today, the patient felt that all questions had been answered satisfactorily. The patient was pleased with the visit and very appreciative for  the care rendered.        18

## 2025-06-11 RX ORDER — FLASH GLUCOSE SENSOR
KIT MISCELLANEOUS
Qty: 1 EACH | Refills: 0 | Status: SHIPPED | OUTPATIENT
Start: 2025-06-11

## 2025-06-25 NOTE — PROGRESS NOTES
"INITIAL EVALUATION       HISTORY OF PRESENT ILLNESS:   Kati Lauren is a 59 y.o. female whom presents to me today for evaluation of menopausal symptoms, referred to me by Alina Phan PA-C. Patient reports symptoms of neck and left shoulder pain, weight gain most noticeable in her mid section, low energy, vaginal dryness, dyspareunia, and constipation. Reports that she has done a lot of research and she is interested in the Vivelle Dot and progesterone therapy for MHT.   (She attended a Menopause [facility]).    She eats \"cleanly\", works out for 2 hours daily and maintains a very healthy lifestyle, so the changes in her health have been distressing to her.    No h/o VTE, liver disease or clotting disorders. Denies a family history of breast, ovarian, and uterine cancer.     PAST MEDICAL HISTORY:  Medical History[1]    PAST SURGICAL HISTORY:  Surgical History[2]     ALLERGIES:   Allergies[3]     MEDICATIONS:   Medication Documentation Review Audit       Reviewed by Erica Leyva MA (Medical Assistant) on 06/26/25 at 0904      Medication Order Taking? Sig Documenting Provider Last Dose Status   famotidine (Pepcid) 40 mg tablet [id number]  Take 1 tablet (40 mg) by mouth once daily.   Patient not taking: Reported on 6/26/2025    Deysi Matthew APRN-CNP  Active   FreeStyle Flory 14 Day Sensor kit [id number] Yes Use as instructed Alina Phan PA-C  Active                     SOCIAL HISTORY:  Patient  reports that she has never smoked. She has never used smokeless tobacco. She reports that she does not currently use alcohol. She reports that she does not use drugs.   Social History     Socioeconomic History    Marital status:      Spouse name: Not on file    Number of children: Not on file    Years of education: Not on file    Highest education level: Not on file   Occupational History    Not on file   Tobacco Use    Smoking status: Never    Smokeless tobacco: Never   Vaping Use    Vaping status: Never " "Used   Substance and Sexual Activity    Alcohol use: Not Currently    Drug use: Never    Sexual activity: Yes   Other Topics Concern    Not on file   Social History Narrative    Not on file     Social Drivers of Health     Financial Resource Strain: Not on file   Food Insecurity: Not on file   Transportation Needs: Not on file   Physical Activity: Not on file   Stress: Not on file   Social Connections: Not on file   Intimate Partner Violence: Not on file   Housing Stability: Not on file       FAMILY HISTORY:  Family History[4]    REVIEW OF SYSTEMS:  Constitutional: Negative for fever and chills. Denies anorexia, weight loss.  Eyes: Negative for visual disturbance.   Respiratory: Negative for shortness of breath.    Cardiovascular: Negative for chest pain.   Gastrointestinal: Negative for nausea and vomiting.   Genitourinary: See interval history above.  Skin: Negative for rash.   Neurological: Negative for dizziness and numbness.   Psychiatric/Behavioral: Negative for confusion and decreased concentration.     PHYSICAL EXAM:  Blood pressure 117/76, pulse 67, height 1.727 m (5' 8\"), weight 69.9 kg (154 lb).  Constitutional: Patient appears well-developed and well-nourished. No distress.    Head: Normocephalic and atraumatic.    Neck: Normal range of motion.    Cardiovascular: Normal rate.    Pulmonary/Chest: Effort normal. No respiratory distress.   Musculoskeletal: Normal range of motion.    Neurological: Alert and oriented to person, place, and time.  Psychiatric: Normal mood and affect. Behavior is normal. Thought content normal.       Assessment:      1. Menopausal symptoms            Kati Lauren is a 59 y.o. female with menopausal symptoms.     Current research considers the initiation of menopausal hormone therapy (MHT) to be a safe option for healthy, symptomatic women who are within 10 years of menopause or younger than age 60 years and who do not have contraindications to MHT (such as a history of breast " cancer, coronary heart disease, a previous venous thromboembolic event or stroke, or active liver disease).     Benefits include improvement in mood, sleep, and hot flashes. We will start at the lowest dose of a weekly estrogen patch and oral micronized progesterone tablet to protect the lining of the uterus from the estrogen.       We will re-evaluate in 3 months to see if the dose is the right amount of estrogen. The risk of breast cancer is individualized, but we will try to avoid continuing estrogen beyond 5 years for this reason unless menopausal symptoms continue to disrupt your quality of life. At that time, we can make a shared and informed decision about risks/benefits.     Plan:   Prescription for estradiol 0.01% vaginal cream sent to the patient's pharmacy.    Prescription for estradiol 0.05 mg patches sent to the patient's pharmacy.    Prescription for progesterone 100 mg capsules sent to the patient's pharmacy.    Provided samples of Uberlube.   Order testosterone, estrogen, A1c, lipid panel, and cortisol panel. (We discussed that I don't recommend blood work, but she would really like to have it done)  Last pap: 8/26/22 (negative)   Follow-up in 3 months.     I spent 50 minutes of dedicated E&M time, including preparation and review of records, notes, and data, time spent with patient/family, and documentation.     Lizzie Dickens MD MPH      Scribe Attestation  By signing my name below, IJaclyn, Scribe   attest that this documentation has been prepared under the direction and in the presence of Lizzie Dickens MD MPH.           [1]   Past Medical History:  Diagnosis Date    Allergic contact dermatitis due to plants, except food 08/14/2024    Dysuria 06/30/2024    Encounter for screening for malignant neoplasm of colon 01/27/2020    Encounter for screening colonoscopy    Encounter for screening for malignant neoplasm of colon 09/30/2024    Hemorrhoids     Herpes labialis 01/25/2022    Rectal  bleeding     Urinary incontinence    [2]   Past Surgical History:  Procedure Laterality Date    OTHER SURGICAL HISTORY N/A 08/26/2022    hemorrhoid light therapy   [3] No Known Allergies  [4] No family history on file.

## 2025-06-26 ENCOUNTER — APPOINTMENT (OUTPATIENT)
Dept: UROLOGY | Facility: CLINIC | Age: 60
End: 2025-06-26
Payer: COMMERCIAL

## 2025-06-26 VITALS
WEIGHT: 154 LBS | BODY MASS INDEX: 23.34 KG/M2 | HEART RATE: 67 BPM | SYSTOLIC BLOOD PRESSURE: 117 MMHG | HEIGHT: 68 IN | DIASTOLIC BLOOD PRESSURE: 76 MMHG

## 2025-06-26 DIAGNOSIS — N95.8 GENITOURINARY SYNDROME OF MENOPAUSE: ICD-10-CM

## 2025-06-26 DIAGNOSIS — N95.1 MENOPAUSAL SYMPTOMS: Primary | ICD-10-CM

## 2025-06-26 PROCEDURE — 3008F BODY MASS INDEX DOCD: CPT | Performed by: OBSTETRICS & GYNECOLOGY

## 2025-06-26 PROCEDURE — 1036F TOBACCO NON-USER: CPT | Performed by: OBSTETRICS & GYNECOLOGY

## 2025-06-26 PROCEDURE — 99204 OFFICE O/P NEW MOD 45 MIN: CPT | Performed by: OBSTETRICS & GYNECOLOGY

## 2025-06-26 RX ORDER — PROGESTERONE 100 MG/1
100 CAPSULE ORAL NIGHTLY
Qty: 30 CAPSULE | Refills: 5 | Status: SHIPPED | OUTPATIENT
Start: 2025-06-26 | End: 2026-06-26

## 2025-06-26 RX ORDER — ESTRADIOL 0.05 MG/D
1 FILM, EXTENDED RELEASE TRANSDERMAL WEEKLY
Qty: 24 PATCH | Refills: 1 | Status: SHIPPED | OUTPATIENT
Start: 2025-06-26 | End: 2026-06-26

## 2025-06-26 RX ORDER — BLOOD-GLUCOSE,RECEIVER,CONT
EACH MISCELLANEOUS
Qty: 1 EACH | Refills: 0 | Status: SHIPPED | OUTPATIENT
Start: 2025-06-26

## 2025-06-26 RX ORDER — PROGESTERONE 100 MG/1
100 CAPSULE ORAL NIGHTLY
Qty: 60 CAPSULE | Refills: 3 | Status: SHIPPED | OUTPATIENT
Start: 2025-06-26 | End: 2026-06-26

## 2025-06-26 RX ORDER — ESTRADIOL 0.1 MG/G
CREAM VAGINAL
Qty: 42.5 G | Refills: 3 | Status: SHIPPED | OUTPATIENT
Start: 2025-06-26

## 2025-06-27 DIAGNOSIS — N95.8 GENITOURINARY SYNDROME OF MENOPAUSE: ICD-10-CM

## 2025-06-27 DIAGNOSIS — N95.1 MENOPAUSAL SYMPTOMS: ICD-10-CM

## 2025-06-27 LAB
25(OH)D3+25(OH)D2 SERPL-MCNC: 74 NG/ML (ref 30–100)
CHOLEST SERPL-MCNC: 209 MG/DL
CHOLEST/HDLC SERPL: 3 (CALC)
EST. AVERAGE GLUCOSE BLD GHB EST-MCNC: 114 MG/DL
EST. AVERAGE GLUCOSE BLD GHB EST-SCNC: 6.3 MMOL/L
ESTRADIOL SERPL-MCNC: <15 PG/ML
FSH SERPL-ACNC: 74.2 MIU/ML
HBA1C MFR BLD: 5.6 %
HDLC SERPL-MCNC: 69 MG/DL
INSULIN FREE SERPL-ACNC: NORMAL U[IU]/ML
INSULIN SERPL-ACNC: 5.4 UIU/ML
LDLC SERPL CALC-MCNC: 119 MG/DL (CALC)
LH SERPL-ACNC: 45 MIU/ML
NONHDLC SERPL-MCNC: 140 MG/DL (CALC)
TESTOST FREE SERPL-MCNC: NORMAL PG/ML
TESTOST SERPL-MCNC: NORMAL NG/DL
TRIGL SERPL-MCNC: 107 MG/DL

## 2025-06-27 RX ORDER — ESTRADIOL 0.05 MG/D
FILM, EXTENDED RELEASE TRANSDERMAL
Qty: 4 PATCH | Refills: 11 | Status: SHIPPED | OUTPATIENT
Start: 2025-06-27

## 2025-07-01 LAB
25(OH)D3+25(OH)D2 SERPL-MCNC: 74 NG/ML (ref 30–100)
CHOLEST SERPL-MCNC: 209 MG/DL
CHOLEST/HDLC SERPL: 3 (CALC)
EST. AVERAGE GLUCOSE BLD GHB EST-MCNC: 114 MG/DL
EST. AVERAGE GLUCOSE BLD GHB EST-SCNC: 6.3 MMOL/L
ESTRADIOL SERPL-MCNC: <15 PG/ML
FSH SERPL-ACNC: 74.2 MIU/ML
HBA1C MFR BLD: 5.6 %
HDLC SERPL-MCNC: 69 MG/DL
INSULIN FREE SERPL-ACNC: 4.5 UIU/ML (ref 1.5–14.9)
INSULIN SERPL-ACNC: 5.4 UIU/ML
LDLC SERPL CALC-MCNC: 119 MG/DL (CALC)
LH SERPL-ACNC: 45 MIU/ML
NONHDLC SERPL-MCNC: 140 MG/DL (CALC)
TESTOST FREE SERPL-MCNC: 0.6 PG/ML (ref 0.1–6.4)
TESTOST SERPL-MCNC: 9 NG/DL (ref 2–45)
TRIGL SERPL-MCNC: 107 MG/DL

## 2025-07-23 DIAGNOSIS — Z12.31 ENCOUNTER FOR SCREENING MAMMOGRAM FOR BREAST CANCER: ICD-10-CM

## 2025-07-23 NOTE — PROGRESS NOTES
"Subjective   Patient ID: Kati Lauren is a 59 y.o. female who presents for Vaginitis/Bacterial Vaginosis.  HPI    Last seen annual 9/2024. Seen at [facility] 3 weeks ago. Had UTI symptoms burning, frequency. Treated with Bactrim and then they called and changed to Keflex. UTI symptoms improved but got itching and got a possible yeast infection. Took Fluconazole one week ago, 2 doses 3 days apart. Finished cephalexin 6 days ago. No more burning. Little itching and a little frequency. Feels a cyst vaginally.    Started HRT 4 weeks ago with Dr. Dickens.      Objective   Physical Exam  Constitutional: Alert and in no acute distress. Well developed, well nourished.  Abdomen: soft nontender; no abdominal mass palpated, no organomegaly and no hernias.  Genitourinary: external genitalia: slight erythema still on labia majora, no inguinal lymphadenopathy, Bartholin's urethral and [place]'s glands: normal, urethra: normal and bladder: normal on palpation.  \"Cyst\" that patient sees is a midline hymenal tag.  Vagina: normal. Scant discharge.  Cervix: normal.  Uterus: normal.   Right adnexa/parametria: normal.  Left adnexa/parametria: normal.  Psychiatric: alert and oriented x 3, affect normal to patient baseline and mood appropriate.     Assessment/Plan   -urine  -GC/CT  -vaginal culture-  -vulvar handout given. Suggest she try A&D, continue to avoid soap. She likely had a bad infection that is slowly healing.  -Follow up annual September.  -She was started on HRT by another provider.         Giuliana Estrada MD 07/24/25 9:29 AM   "

## 2025-07-24 ENCOUNTER — OFFICE VISIT (OUTPATIENT)
Dept: OBSTETRICS AND GYNECOLOGY | Facility: CLINIC | Age: 60
End: 2025-07-24
Payer: COMMERCIAL

## 2025-07-24 VITALS — WEIGHT: 154 LBS | BODY MASS INDEX: 23.42 KG/M2 | SYSTOLIC BLOOD PRESSURE: 120 MMHG | DIASTOLIC BLOOD PRESSURE: 82 MMHG

## 2025-07-24 DIAGNOSIS — N89.8 VAGINAL IRRITATION: ICD-10-CM

## 2025-07-24 DIAGNOSIS — R35.0 URINARY FREQUENCY: Primary | ICD-10-CM

## 2025-07-24 LAB
POC APPEARANCE, URINE: CLEAR
POC BILIRUBIN, URINE: NEGATIVE
POC BLOOD, URINE: NEGATIVE
POC COLOR, URINE: YELLOW
POC GLUCOSE, URINE: NEGATIVE MG/DL
POC KETONES, URINE: NEGATIVE MG/DL
POC LEUKOCYTES, URINE: NEGATIVE
POC NITRITE,URINE: NEGATIVE
POC PH, URINE: 5 PH
POC PROTEIN, URINE: ABNORMAL MG/DL
POC SPECIFIC GRAVITY, URINE: 1.01
POC UROBILINOGEN, URINE: 0.2 EU/DL

## 2025-07-24 PROCEDURE — 81002 URINALYSIS NONAUTO W/O SCOPE: CPT | Performed by: OBSTETRICS & GYNECOLOGY

## 2025-07-24 PROCEDURE — 1036F TOBACCO NON-USER: CPT | Performed by: OBSTETRICS & GYNECOLOGY

## 2025-07-24 PROCEDURE — 99213 OFFICE O/P EST LOW 20 MIN: CPT | Performed by: OBSTETRICS & GYNECOLOGY

## 2025-07-25 LAB
BV SCORE VAG QL: NORMAL
C TRACH RRNA SPEC QL NAA+PROBE: NOT DETECTED
N GONORRHOEA RRNA SPEC QL NAA+PROBE: NOT DETECTED
QUEST GC CT AMPLIFIED (ALWAYS MESSAGE): NORMAL

## 2025-07-26 LAB — BACTERIA UR CULT: NORMAL

## 2025-07-30 ENCOUNTER — TELEPHONE (OUTPATIENT)
Dept: OBSTETRICS AND GYNECOLOGY | Facility: CLINIC | Age: 60
End: 2025-07-30
Payer: COMMERCIAL

## 2025-07-30 DIAGNOSIS — N95.0 POSTMENOPAUSAL BLEEDING: Primary | ICD-10-CM

## 2025-07-30 NOTE — TELEPHONE ENCOUNTER
Patient called brown spotting for a couple days. Has been on HRT for 5-6 weeks. Will get ultrasound.

## 2025-08-14 DIAGNOSIS — N95.1 MENOPAUSAL SYMPTOMS: ICD-10-CM

## 2025-08-14 DIAGNOSIS — N95.8 GENITOURINARY SYNDROME OF MENOPAUSE: ICD-10-CM

## 2025-08-14 RX ORDER — ESTRADIOL 0.05 MG/D
FILM, EXTENDED RELEASE TRANSDERMAL
Qty: 4 PATCH | Refills: 11 | Status: SHIPPED | OUTPATIENT
Start: 2025-08-14

## 2025-08-14 RX ORDER — ESTRADIOL 0.05 MG/D
1 FILM, EXTENDED RELEASE TRANSDERMAL WEEKLY
Qty: 24 PATCH | Refills: 1 | Status: SHIPPED | OUTPATIENT
Start: 2025-08-14 | End: 2026-08-14

## 2025-10-02 ENCOUNTER — APPOINTMENT (OUTPATIENT)
Dept: UROLOGY | Facility: CLINIC | Age: 60
End: 2025-10-02
Payer: COMMERCIAL

## (undated) DEVICE — SPONGE, HEMOSTATIC, GELATIN, SURGIFOAM, 8 X 12.5 CM X 10 MM

## (undated) DEVICE — CORD, CAUTERY, BIPOLAR, STERILE

## (undated) DEVICE — Device